# Patient Record
Sex: MALE | Race: WHITE | NOT HISPANIC OR LATINO | Employment: FULL TIME | URBAN - METROPOLITAN AREA
[De-identification: names, ages, dates, MRNs, and addresses within clinical notes are randomized per-mention and may not be internally consistent; named-entity substitution may affect disease eponyms.]

---

## 2018-01-29 DIAGNOSIS — Z11.4 ENCOUNTER FOR SCREENING FOR HIV: ICD-10-CM

## 2018-01-29 DIAGNOSIS — Z11.3 SCREENING EXAMINATION FOR VENEREAL DISEASE: ICD-10-CM

## 2018-01-29 DIAGNOSIS — Z11.59 NEED FOR HEPATITIS C SCREENING TEST: Primary | ICD-10-CM

## 2018-01-29 PROCEDURE — 87340 HEPATITIS B SURFACE AG IA: CPT | Performed by: OBSTETRICS & GYNECOLOGY

## 2018-01-29 PROCEDURE — 87389 HIV-1 AG W/HIV-1&-2 AB AG IA: CPT | Performed by: OBSTETRICS & GYNECOLOGY

## 2018-01-29 PROCEDURE — 36415 COLL VENOUS BLD VENIPUNCTURE: CPT | Performed by: OBSTETRICS & GYNECOLOGY

## 2018-01-29 PROCEDURE — 86780 TREPONEMA PALLIDUM: CPT | Performed by: OBSTETRICS & GYNECOLOGY

## 2018-01-29 PROCEDURE — 86803 HEPATITIS C AB TEST: CPT | Performed by: OBSTETRICS & GYNECOLOGY

## 2018-01-30 LAB
HBV SURFACE AG SERPL QL IA: NONREACTIVE
HCV AB SERPL QL IA: NONREACTIVE
HIV 1+2 AB+HIV1 P24 AG SERPL QL IA: NONREACTIVE
T PALLIDUM IGG+IGM SER QL: NEGATIVE

## 2018-10-16 ENCOUNTER — OFFICE VISIT (OUTPATIENT)
Dept: FAMILY MEDICINE | Facility: CLINIC | Age: 38
End: 2018-10-16
Payer: COMMERCIAL

## 2018-10-16 ENCOUNTER — TELEPHONE (OUTPATIENT)
Dept: FAMILY MEDICINE | Facility: CLINIC | Age: 38
End: 2018-10-16

## 2018-10-16 VITALS
OXYGEN SATURATION: 98 % | TEMPERATURE: 97.6 F | HEIGHT: 71 IN | DIASTOLIC BLOOD PRESSURE: 84 MMHG | HEART RATE: 71 BPM | SYSTOLIC BLOOD PRESSURE: 126 MMHG | RESPIRATION RATE: 14 BRPM | WEIGHT: 187.4 LBS | BODY MASS INDEX: 26.23 KG/M2

## 2018-10-16 DIAGNOSIS — Z00.8 ENCOUNTER FOR BIOMETRIC SCREENING: Primary | ICD-10-CM

## 2018-10-16 DIAGNOSIS — Z23 NEED FOR PROPHYLACTIC VACCINATION AND INOCULATION AGAINST INFLUENZA: ICD-10-CM

## 2018-10-16 LAB
CHOLEST SERPL-MCNC: 164 MG/DL
GLUCOSE SERPL-MCNC: 90 MG/DL (ref 70–99)
HDLC SERPL-MCNC: 57 MG/DL
LDLC SERPL CALC-MCNC: 95 MG/DL
NONHDLC SERPL-MCNC: 107 MG/DL
TRIGL SERPL-MCNC: 59 MG/DL

## 2018-10-16 PROCEDURE — 82947 ASSAY GLUCOSE BLOOD QUANT: CPT | Performed by: INTERNAL MEDICINE

## 2018-10-16 PROCEDURE — 80061 LIPID PANEL: CPT | Performed by: INTERNAL MEDICINE

## 2018-10-16 PROCEDURE — 90471 IMMUNIZATION ADMIN: CPT | Performed by: INTERNAL MEDICINE

## 2018-10-16 PROCEDURE — 90686 IIV4 VACC NO PRSV 0.5 ML IM: CPT | Performed by: INTERNAL MEDICINE

## 2018-10-16 PROCEDURE — 99212 OFFICE O/P EST SF 10 MIN: CPT | Mod: 25 | Performed by: INTERNAL MEDICINE

## 2018-10-16 PROCEDURE — 36415 COLL VENOUS BLD VENIPUNCTURE: CPT | Performed by: INTERNAL MEDICINE

## 2018-10-16 NOTE — PROGRESS NOTES
"  SUBJECTIVE:   Marcin Santo is a 37 year old male who presents to clinic today for the following health issues:  Chief Complaint   Patient presents with     Forms     health screening for employment, patient is not fasting     Imm/Inj     flu vaccine        Marcin presents for cholesterol and glucose screening for work.  He is feeling well.        Problem list and histories reviewed & adjusted, as indicated.  Additional history: as documented    There is no problem list on file for this patient.    Past Surgical History:   Procedure Laterality Date     APPENDECTOMY OPEN  1997       Social History   Substance Use Topics     Smoking status: Never Smoker     Smokeless tobacco: Never Used     Alcohol use Yes      Comment: social 1 time a week      Family History   Problem Relation Age of Onset     Diabetes Paternal Grandfather      Breast Cancer Paternal Grandfather      Prostate Cancer Paternal Grandfather          Current Outpatient Prescriptions   Medication Sig Dispense Refill     NO ACTIVE MEDICATIONS        No Known Allergies    Reviewed and updated as needed this visit by clinical staff  Tobacco  Allergies  Med Hx  Surg Hx  Fam Hx  Soc Hx      Reviewed and updated as needed this visit by Provider         ROS:  Constitutional, cardiovascular, pulmonary systems are negative, except as otherwise noted.    OBJECTIVE:     /84 (BP Location: Right arm, Patient Position: Chair, Cuff Size: Adult Regular)  Pulse 71  Temp 97.6  F (36.4  C) (Tympanic)  Resp 14  Ht 5' 10.5\" (1.791 m)  Wt 187 lb 6.4 oz (85 kg)  SpO2 98%  BMI 26.51 kg/m2  Body mass index is 26.51 kg/(m^2).  GENERAL: healthy, alert and no distress  RESP: lungs clear to auscultation - no rales, rhonchi or wheezes  CV: regular rate and rhythm, normal S1 S2, no S3 or S4, no murmur, click or rub, no peripheral edema         ASSESSMENT/PLAN:       1. Encounter for biometric screening    Labs in process.  Will complete and send form when done. "     - Lipid panel reflex to direct LDL Non-fasting  - Glucose    2. Need for prophylactic vaccination and inoculation against influenza    - FLU VACCINE, SPLIT VIRUS, IM (QUADRIVALENT) [72890]- >3 YRS    Giles Xie MD  [facility]    Injectable Influenza Immunization Documentation    1.  Is the person to be vaccinated sick today?   No    2. Does the person to be vaccinated have an allergy to a component   of the vaccine?   No  Egg Allergy Algorithm Link    3. Has the person to be vaccinated ever had a serious reaction   to influenza vaccine in the past?   No    4. Has the person to be vaccinated ever had Guillain-Barré syndrome?   No    Form completed by Jess LOBO CMA ([facility])

## 2018-10-16 NOTE — MR AVS SNAPSHOT
"              After Visit Summary   10/16/2018    Marcin Santo    MRN: [id number]           Patient Information     Date Of Birth          1980        Visit Information        Provider Department      10/16/2018 9:20 AM Giles Xie MD [facility]        Today's Diagnoses     Need for prophylactic vaccination and inoculation against influenza    -  1    Encounter for biometric screening           Follow-ups after your visit        Follow-up notes from your care team     Return if symptoms worsen or fail to improve.      Who to contact     If you have questions or need follow up information about today's clinic visit or your schedule please contact [facility] directly at [phone].  Normal or non-critical lab and imaging results will be communicated to you by MyChart, letter or phone within 4 business days after the clinic has received the results. If you do not hear from us within 7 days, please contact the clinic through MyChart or phone. If you have a critical or abnormal lab result, we will notify you by phone as soon as possible.  Submit refill requests through MyNextRun or call your pharmacy and they will forward the refill request to us. Please allow 3 business days for your refill to be completed.          Additional Information About Your Visit        Care EveryWhere ID     This is your Care EveryWhere ID. This could be used by other organizations to access your [facility] medical records  [id number]        Your Vitals Were     Pulse Temperature Respirations Height Pulse Oximetry BMI (Body Mass Index)    71 97.6  F (36.4  C) (Tympanic) 14 5' 10.5\" (1.791 m) 98% 26.51 kg/m2       Blood Pressure from Last 3 Encounters:   10/16/18 126/84   08/30/16 115/74   07/29/15 120/80    Weight from Last 3 Encounters:   10/16/18 187 lb 6.4 oz (85 kg)   08/30/16 192 lb (87.1 kg)   07/29/15 190 lb (86.2 kg)              We Performed the Following     FLU VACCINE, SPLIT VIRUS, IM " (QUADRIVALENT) [15957]- >3 YRS     Glucose     Lipid panel reflex to direct LDL Non-fasting        Primary Care Provider    None Specified       No primary provider on file.        Equal Access to Services     WILFRID SCHAEFFER : Gloria Oscar, simon moralse, bunnyfrancisco de la pazirisaudelia churchana m, ana laura cecyin hayaacarl churchcayden doyle juan miguel witt. So [facility] [phone].    ATENCIÓN: Si habla español, tiene a courtney disposición servicios gratuitos de asistencia lingüística. Llame al [phone].    We comply with applicable federal civil rights laws and [place] laws. We do not discriminate on the basis of race, color, national origin, age, disability, sex, sexual orientation, or gender identity.            Thank you!     Thank you for choosing [facility]  for your care. Our goal is always to provide you with excellent care. Hearing back from our patients is one way we can continue to improve our services. Please take a few minutes to complete the written survey that you may receive in the mail after your visit with us. Thank you!             Your Updated Medication List - Protect others around you: Learn how to safely use, store and throw away your medicines at www.disposemymeds.org.          This list is accurate as of 10/16/18  9:42 AM.  Always use your most recent med list.                   Brand Name Dispense Instructions for use Diagnosis    NO ACTIVE MEDICATIONS

## 2019-10-24 ENCOUNTER — OFFICE VISIT (OUTPATIENT)
Dept: DERMATOLOGY | Facility: CLINIC | Age: 39
End: 2019-10-24
Payer: COMMERCIAL

## 2019-10-24 VITALS — DIASTOLIC BLOOD PRESSURE: 83 MMHG | HEART RATE: 68 BPM | SYSTOLIC BLOOD PRESSURE: 118 MMHG | OXYGEN SATURATION: 98 %

## 2019-10-24 DIAGNOSIS — D48.5 NEOPLASM OF UNCERTAIN BEHAVIOR OF SKIN: Primary | ICD-10-CM

## 2019-10-24 DIAGNOSIS — D22.9 MULTIPLE BENIGN NEVI: ICD-10-CM

## 2019-10-24 DIAGNOSIS — D18.01 CHERRY ANGIOMA: ICD-10-CM

## 2019-10-24 DIAGNOSIS — L82.1 SEBORRHEIC KERATOSIS: ICD-10-CM

## 2019-10-24 DIAGNOSIS — L81.4 LENTIGO: ICD-10-CM

## 2019-10-24 PROCEDURE — 88342 IMHCHEM/IMCYTCHM 1ST ANTB: CPT | Mod: TC | Performed by: PHYSICIAN ASSISTANT

## 2019-10-24 PROCEDURE — 99203 OFFICE O/P NEW LOW 30 MIN: CPT | Mod: 25 | Performed by: PHYSICIAN ASSISTANT

## 2019-10-24 PROCEDURE — 88305 TISSUE EXAM BY PATHOLOGIST: CPT | Mod: TC | Performed by: PHYSICIAN ASSISTANT

## 2019-10-24 PROCEDURE — 11103 TANGNTL BX SKIN EA SEP/ADDL: CPT | Performed by: PHYSICIAN ASSISTANT

## 2019-10-24 PROCEDURE — 11102 TANGNTL BX SKIN SINGLE LES: CPT | Performed by: PHYSICIAN ASSISTANT

## 2019-10-24 NOTE — LETTER
10/24/2019         RE: Marcin Santo  [address] [facility] [address]        Dear Colleague,    Thank you for referring your patient, Marcin Santo, to the [facility]. Please see a copy of my visit note below.    Marcin Santo is a 38 year old year old male patient here today for skin check. He notes new growth on frontal scalp.   Associated symptoms: none.  Patient has no other skin complaints today.  Remainder of the HPI, Meds, PMH, Allergies, FH, and SH was reviewed in chart.    Pertinent Hx:   No personal history of skin cancer.   History reviewed. No pertinent past medical history.    Past Surgical History:   Procedure Laterality Date     APPENDECTOMY OPEN  1997        Family History   Problem Relation Age of Onset     Diabetes Paternal Grandfather      Breast Cancer Paternal Grandfather      Prostate Cancer Paternal Grandfather      Melanoma No family hx of        Social History     Socioeconomic History     Marital status:      Spouse name: Not on file     Number of children: Not on file     Years of education: Not on file     Highest education level: Not on file   Occupational History     Not on file   Social Needs     Financial resource strain: Not on file     Food insecurity:     Worry: Not on file     Inability: Not on file     Transportation needs:     Medical: Not on file     Non-medical: Not on file   Tobacco Use     Smoking status: Never Smoker     Smokeless tobacco: Never Used   Substance and Sexual Activity     Alcohol use: Yes     Comment: social 1 time a week      Drug use: No     Sexual activity: Yes     Partners: Female   Lifestyle     Physical activity:     Days per week: Not on file     Minutes per session: Not on file     Stress: Not on file   Relationships     Social connections:     Talks on phone: Not on file     Gets together: Not on file     Attends Latter day service: Not on file     Active member of club or organization: Not on file     Attends meetings of  clubs or organizations: Not on file     Relationship status: Not on file     Intimate partner violence:     Fear of current or ex partner: Not on file     Emotionally abused: Not on file     Physically abused: Not on file     Forced sexual activity: Not on file   Other Topics Concern     Parent/sibling w/ CABG, MI or angioplasty before 65F 55M? No      Service No     Blood Transfusions No     Caffeine Concern No     Occupational Exposure No     Hobby Hazards No     Sleep Concern No     Stress Concern No     Weight Concern No     Special Diet No     Back Care No     Exercise Yes     Bike Helmet Yes     Seat Belt Yes     Self-Exams Yes   Social History Narrative     Not on file       Outpatient Encounter Medications as of 10/24/2019   Medication Sig Dispense Refill     NO ACTIVE MEDICATIONS        No facility-administered encounter medications on file as of 10/24/2019.              Review Of Systems  Skin: As above  Eyes: negative  Ears/Nose/Throat: negative  Respiratory: No shortness of breath, dyspnea on exertion, cough, or hemoptysis  Cardiovascular: negative  Gastrointestinal: negative  Genitourinary: negative  Musculoskeletal: negative  Neurologic: negative  Psychiatric: negative  Hematologic/Lymphatic/Immunologic: negative  Endocrine: negative      O:   NAD, WDWN, Alert & Oriented, Mood & Affect wnl, Vitals stable   Here today alone   /83   Pulse 68   SpO2 98%    General appearance normal   Vitals stable   Alert, oriented and in no acute distress     0.5 cm Brown papule on right frontal scalp  0.3 cm Irregular brownish black macule on right upper back    Stuck on papules and brown macules on trunk and ext   Red papules on trunk  Brown papules and macules with regular pigment network and borders on torso and extremities     The remainder of the detailed exam was unremarkable; the following areas were examined:  scalp/hair, conjunctiva/lids, face, neck, lips/teeth, oral mucosa/gingiva, chest, back,  abdomen, buttocks, digits/nails, RUE, LUE, RLE, LLE.      Eyes: Conjunctivae/lids:Normal     ENT: Lips: normal    MSK:Normal    Cardiovascular: peripheral edema none    Pulm: Breathing Normal    Neuro/Psych: Orientation:Normal; Mood/Affect:Normal  A/P:  1. R/O melanoma on right upper back  TANGENTIAL BIOPSY SENT OUT:  After consent, anesthesia with LEC and prep, tangential excision performed and specimen sent out for permanent section histology.  No complications and routine wound care. Patient told to call our office in 1-2 weeks for result.      2. R/O seborrheic keratosis on right frontal scalp  TANGENTIAL BIOPSY SENT OUT:  After consent, anesthesia with LEC and prep, tangential excision performed and specimen sent out for permanent section histology.  No complications and routine wound care. Patient told to call our office in 1-2 weeks for result.      3. Seborrheic keratosis, lentigo, angioma, benign nevi   BENIGN LESIONS DISCUSSED WITH PATIENT:  I discussed the specifics of tumor, prognosis, and genetics of benign lesions.  I explained that treatment of these lesions would be purely cosmetic and not medically neccessary.  I discussed with patient different removal options including excision, cautery and /or laser.      Nature and genetics of benign skin lesions dicussed with patient.  Signs and Symptoms of skin cancer discussed with patient.  ABCDEs of melanoma reviewed with patient.  Patient encouraged to perform monthly skin exams.  UV precautions reviewed with patient.  Risks of non-melanoma skin cancer discussed with patient   Return to clinic pending biopsy results.       Again, thank you for allowing me to participate in the care of your patient.        Sincerely,        Denice Snowden PA-C

## 2019-10-24 NOTE — PATIENT INSTRUCTIONS
Wound Care Instructions     FOR SUPERFICIAL WOUNDS     [facility] [phone]    [facility] [phone]  Right frontal scalp and Right upper back                         AFTER 24 HOURS YOU SHOULD REMOVE THE BANDAGE AND BEGIN DAILY DRESSING CHANGES AS FOLLOWS:     1) Remove Dressing.     2) Clean and dry the area with tap water using a Q-tip or sterile gauze pad.     3) Apply Vaseline, Aquaphor, Polysporin ointment or Bacitracin ointment over entire wound.  Do NOT use Neosporin ointment.     4) Cover the wound with a band-aid, or a sterile non-stick gauze pad and micropore paper tape      REPEAT THESE INSTRUCTIONS AT LEAST ONCE A DAY UNTIL THE WOUND HAS COMPLETELY HEALED.    It is an old wives tale that a wound heals better when it is exposed to air and allowed to dry out. The wound will heal faster with a better cosmetic result if it is kept moist with ointment and covered with a bandage.    **Do not let the wound dry out.**      Supplies Needed:      *Cotton tipped applicators (Q-tips)    *Polysporin Ointment or Bacitracin Ointment (NOT NEOSPORIN)    *Band-aids or non-stick gauze pads and micropore paper tape.      PATIENT INFORMATION:    During the healing process you will notice a number of changes. All wounds develop a small halo of redness surrounding the wound.  This means healing is occurring. Severe itching with extensive redness usually indicates sensitivity to the ointment or bandage tape used to dress the wound.  You should call our office if this develops.      Swelling  and/or discoloration around your surgical site is common, particularly when performed around the eye.    All wounds normally drain.  The larger the wound the more drainage there will be.  After 7-10 days, you will notice the wound beginning to shrink and new skin will begin to grow.  The wound is healed when you can see skin has formed over the entire area.  A healed wound has a healthy, shiny look to  the surface and is red to dark pink in color to normalize.  Wounds may take approximately 4-6 weeks to heal.  Larger wounds may take 6-8 weeks.  After the wound is healed you may discontinue dressing changes.    You may experience a sensation of tightness as your wound heals. This is normal and will gradually subside.    Your healed wound may be sensitive to temperature changes. This sensitivity improves with time, but if you re having a lot of discomfort, try to avoid temperature extremes.    Patients frequently experience itching after their wound appears to have healed because of the continue healing under the skin.  Plain Vaseline will help relieve the itching.        POSSIBLE COMPLICATIONS    BLEEDIN. Leave the bandage in place.  2. Use tightly rolled up gauze or a cloth to apply direct pressure over the bandage for 30  minutes.  3. Reapply pressure for an additional 30 minutes if necessary  4. Use additional gauze and tape to maintain pressure once the bleeding has stopped.

## 2019-10-24 NOTE — PROGRESS NOTES
Marcin Santo is a 38 year old year old male patient here today for skin check. He notes new growth on frontal scalp.   Associated symptoms: none.  Patient has no other skin complaints today.  Remainder of the HPI, Meds, PMH, Allergies, FH, and SH was reviewed in chart.    Pertinent Hx:   No personal history of skin cancer.   History reviewed. No pertinent past medical history.    Past Surgical History:   Procedure Laterality Date     APPENDECTOMY OPEN  1997        Family History   Problem Relation Age of Onset     Diabetes Paternal Grandfather      Breast Cancer Paternal Grandfather      Prostate Cancer Paternal Grandfather      Melanoma No family hx of        Social History     Socioeconomic History     Marital status:      Spouse name: Not on file     Number of children: Not on file     Years of education: Not on file     Highest education level: Not on file   Occupational History     Not on file   Social Needs     Financial resource strain: Not on file     Food insecurity:     Worry: Not on file     Inability: Not on file     Transportation needs:     Medical: Not on file     Non-medical: Not on file   Tobacco Use     Smoking status: Never Smoker     Smokeless tobacco: Never Used   Substance and Sexual Activity     Alcohol use: Yes     Comment: social 1 time a week      Drug use: No     Sexual activity: Yes     Partners: Female   Lifestyle     Physical activity:     Days per week: Not on file     Minutes per session: Not on file     Stress: Not on file   Relationships     Social connections:     Talks on phone: Not on file     Gets together: Not on file     Attends Jewish service: Not on file     Active member of club or organization: Not on file     Attends meetings of clubs or organizations: Not on file     Relationship status: Not on file     Intimate partner violence:     Fear of current or ex partner: Not on file     Emotionally abused: Not on file     Physically abused: Not on file     Forced  sexual activity: Not on file   Other Topics Concern     Parent/sibling w/ CABG, MI or angioplasty before 65F 55M? No      Service No     Blood Transfusions No     Caffeine Concern No     Occupational Exposure No     Hobby Hazards No     Sleep Concern No     Stress Concern No     Weight Concern No     Special Diet No     Back Care No     Exercise Yes     Bike Helmet Yes     Seat Belt Yes     Self-Exams Yes   Social History Narrative     Not on file       Outpatient Encounter Medications as of 10/24/2019   Medication Sig Dispense Refill     NO ACTIVE MEDICATIONS        No facility-administered encounter medications on file as of 10/24/2019.              Review Of Systems  Skin: As above  Eyes: negative  Ears/Nose/Throat: negative  Respiratory: No shortness of breath, dyspnea on exertion, cough, or hemoptysis  Cardiovascular: negative  Gastrointestinal: negative  Genitourinary: negative  Musculoskeletal: negative  Neurologic: negative  Psychiatric: negative  Hematologic/Lymphatic/Immunologic: negative  Endocrine: negative      O:   NAD, WDWN, Alert & Oriented, Mood & Affect wnl, Vitals stable   Here today alone   /83   Pulse 68   SpO2 98%    General appearance normal   Vitals stable   Alert, oriented and in no acute distress     0.5 cm Brown papule on right frontal scalp  0.3 cm Irregular brownish black macule on right upper back    Stuck on papules and brown macules on trunk and ext   Red papules on trunk  Brown papules and macules with regular pigment network and borders on torso and extremities     The remainder of the detailed exam was unremarkable; the following areas were examined:  scalp/hair, conjunctiva/lids, face, neck, lips/teeth, oral mucosa/gingiva, chest, back, abdomen, buttocks, digits/nails, RUE, LUE, RLE, LLE.      Eyes: Conjunctivae/lids:Normal     ENT: Lips: normal    MSK:Normal    Cardiovascular: peripheral edema none    Pulm: Breathing Normal    Neuro/Psych: Orientation:Normal;  Mood/Affect:Normal  A/P:  1. R/O melanoma on right upper back  TANGENTIAL BIOPSY SENT OUT:  After consent, anesthesia with LEC and prep, tangential excision performed and specimen sent out for permanent section histology.  No complications and routine wound care. Patient told to call our office in 1-2 weeks for result.      2. R/O seborrheic keratosis on right frontal scalp  TANGENTIAL BIOPSY SENT OUT:  After consent, anesthesia with LEC and prep, tangential excision performed and specimen sent out for permanent section histology.  No complications and routine wound care. Patient told to call our office in 1-2 weeks for result.      3. Seborrheic keratosis, lentigo, angioma, benign nevi   BENIGN LESIONS DISCUSSED WITH PATIENT:  I discussed the specifics of tumor, prognosis, and genetics of benign lesions.  I explained that treatment of these lesions would be purely cosmetic and not medically neccessary.  I discussed with patient different removal options including excision, cautery and /or laser.      Nature and genetics of benign skin lesions dicussed with patient.  Signs and Symptoms of skin cancer discussed with patient.  ABCDEs of melanoma reviewed with patient.  Patient encouraged to perform monthly skin exams.  UV precautions reviewed with patient.  Risks of non-melanoma skin cancer discussed with patient   Return to clinic pending biopsy results.

## 2019-10-24 NOTE — NURSING NOTE
"Initial /83   Pulse 68   SpO2 98%  Estimated body mass index is 26.51 kg/m  as calculated from the following:    Height as of 10/16/18: 1.791 m (5' 10.5\").    Weight as of 10/16/18: 85 kg (187 lb 6.4 oz). .    Lola Rousseau LPN    "

## 2019-10-30 LAB — COPATH REPORT: NORMAL

## 2019-12-10 ENCOUNTER — OFFICE VISIT (OUTPATIENT)
Dept: DERMATOLOGY | Facility: CLINIC | Age: 39
End: 2019-12-10
Payer: COMMERCIAL

## 2019-12-10 VITALS — HEART RATE: 68 BPM | SYSTOLIC BLOOD PRESSURE: 129 MMHG | DIASTOLIC BLOOD PRESSURE: 77 MMHG | OXYGEN SATURATION: 97 %

## 2019-12-10 DIAGNOSIS — D48.5 NEOPLASM OF UNCERTAIN BEHAVIOR OF SKIN: Primary | ICD-10-CM

## 2019-12-10 PROCEDURE — 11602 EXC TR-EXT MAL+MARG 1.1-2 CM: CPT | Performed by: DERMATOLOGY

## 2019-12-10 PROCEDURE — 88342 IMHCHEM/IMCYTCHM 1ST ANTB: CPT | Performed by: DERMATOLOGY

## 2019-12-10 PROCEDURE — 88331 PATH CONSLTJ SURG 1 BLK 1SPC: CPT | Performed by: DERMATOLOGY

## 2019-12-10 NOTE — PROGRESS NOTES
Marcin Santo is a 38 year old year old male patient here today for evaluation and managment of severly atypical mole on back.  Patient has no other skin complaints today.  Remainder of the HPI, Meds, PMH, Allergies, FH, and SH was reviewed in chart.    History reviewed. No pertinent past medical history.    Past Surgical History:   Procedure Laterality Date     APPENDECTOMY OPEN  1997        Family History   Problem Relation Age of Onset     Diabetes Paternal Grandfather      Breast Cancer Paternal Grandfather      Prostate Cancer Paternal Grandfather      Melanoma No family hx of        Social History     Socioeconomic History     Marital status:      Spouse name: Not on file     Number of children: Not on file     Years of education: Not on file     Highest education level: Not on file   Occupational History     Not on file   Social Needs     Financial resource strain: Not on file     Food insecurity:     Worry: Not on file     Inability: Not on file     Transportation needs:     Medical: Not on file     Non-medical: Not on file   Tobacco Use     Smoking status: Never Smoker     Smokeless tobacco: Never Used   Substance and Sexual Activity     Alcohol use: Yes     Comment: social 1 time a week      Drug use: No     Sexual activity: Yes     Partners: Female   Lifestyle     Physical activity:     Days per week: Not on file     Minutes per session: Not on file     Stress: Not on file   Relationships     Social connections:     Talks on phone: Not on file     Gets together: Not on file     Attends [employer] service: Not on file     Active member of club or organization: Not on file     Attends meetings of clubs or organizations: Not on file     Relationship status: Not on file     Intimate partner violence:     Fear of current or ex partner: Not on file     Emotionally abused: Not on file     Physically abused: Not on file     Forced sexual activity: Not on file   Other Topics Concern     Parent/sibling w/  CABG, MI or angioplasty before 65F 55M? No      Service No     Blood Transfusions No     Caffeine Concern No     Occupational Exposure No     Hobby Hazards No     Sleep Concern No     Stress Concern No     Weight Concern No     Special Diet No     Back Care No     Exercise Yes     Bike Helmet Yes     Seat Belt Yes     Self-Exams Yes   Social History Narrative     Not on file       Outpatient Encounter Medications as of 12/10/2019   Medication Sig Dispense Refill     NO ACTIVE MEDICATIONS        No facility-administered encounter medications on file as of 12/10/2019.              Review Of Systems  Skin: As above  Eyes: negative  Ears/Nose/Throat: negative  Respiratory: No shortness of breath, dyspnea on exertion, cough, or hemoptysis  Cardiovascular: negative  Gastrointestinal: negative  Genitourinary: negative  Musculoskeletal: negative  Neurologic: negative  Psychiatric: negative  Hematologic/Lymphatic/Immunologic: negative  Endocrine: negative      O:   NAD, WDWN, Alert & Oriented, Mood & Affect wnl, Vitals stable   Here today alone   /77   Pulse 68   SpO2 97%    General appearance normal   Vitals stable   Alert, oriented and in no acute distress     R upper mid back 6mm red macule      Eyes: Conjunctivae/lids:Normal     ENT: Lips, buccal mucosa, tongue: normal    MSK:Normal    Cardiovascular: peripheral edema none    Pulm: Breathing Normal    Lymph Nodes: No Head and Neck Lymphadenopathy     Neuro/Psych: Orientation:Alert and Orientedx3 ; Mood/Affect:normal       MICRO:   R upper mid back:Unremarkable epidermis with parallel bundles of cellular collagen within the superficial dermis.  No concerning areas for malignancy.     A/P:  1. Atypical nevus  Atypical moles are unusual benign moles that may resemble melanoma. People who have them are at increased risk of developing single or multiple melanomas. The higher the number of these moles someone has, the higher the risk; those who have 10 or more  "have 12 times the risk of developing melanoma compared to the general population. Atypical nevi are found significantly more often in melanoma patients than in the general population.    While atypical moles are considered to be pre-cancerous (more likely to turn into melanoma than regular moles), not everyone who has atypical moles gets melanoma. In fact, most moles -- both ordinary and atypical ones -- never become cancerous. Thus the removal of all atypical nevi is unnecessary. In fact, most of the melanomas found on people with atypical moles arise from normal skin and not an atypical mole.    Although a physician bases the initial diagnosis of atypical moles on a physical examination, removing several moles and examining them under a microscope must confirm the diagnosis. This procedure, called a biopsy.    A pathologist will examine the tissue under a microscope and make the precise diagnosis. Diagnosis by biopsy is not exact, and in difficult cases doctors may split 50/50 down the middle as to whether a mole is melanoma or benign. If the pathologist uses the term \"severely dysplastic\" or \"atypical melanocytic hyperplasia\" or offers a long descriptive narrative it means he really is concerned about melanoma, but does not want to call it that.    Most dermatologists usually recommend that all patients with these severely dysplastic moles have them removed. Also many dermatologists recommend removing \"moderate dysplasia\" moles, if the biopsy didn't get all of it. Those with \"mild dysplasia\" can usually be left alone or watched.    R upper mid back sevetrluy atypical nevus  EXCISION OF AN, Margins confirmed with FROZEN SECTIONS and MART1 stain AND Second intent: After thorough discussion of PGACAC, consent obtained, anesthesia and prep, the margins of the lesion were identified and an elliptical incision was made encompassing the lesion with 4mm margin. The incisions were made through the skin and down to and " including the superficial dermis.  The lesion was removed en bloc and submitted for frozen section pathologic review. Clear margins obtained (1.1cm).  MART1 performed on one sectrion.,   REPAIR SECOND INTENT: We discussed the options for wound management in full with the patient including risks/benefits/possible outcomes. Decision made to allow the wound to heal by second intention. EBL minimal; complications none; wound care routine.  The patient was discharged in good condition and will return in one month or prn for wound evaluation.     BENIGN LESIONS DISCUSSED WITH PATIENT:  I discussed the specifics of tumor, prognosis, and genetics of benign lesions.  I explained that treatment of these lesions would be purely cosmetic and not medically neccessary.  I discussed with patient different removal options including excision, cautery and /or laser.      Nature and genetics of benign skin lesions dicussed with patient.  Signs and Symptoms of skin cancer discussed with patient.  ABCDEs of melanoma reviewed with patient.  Patient encouraged to perform monthly skin exams.  UV precautions reviewed with patient.  Patient to follow up with Primary Care provider regarding elevated blood pressure.  Skin care regimen reviewed with patient: Eliminate harsh soaps, i.e. Dial, zest, irsih spring; Mild soaps such as Cetaphil or Dove sensitive skin, avoid hot or cold showers, aggressive use of emollients including vanicream, cetaphil or cerave discussed with patient.    Risks of non-melanoma skin cancer discussed with patient   Return to clinic 6 months

## 2019-12-10 NOTE — PATIENT INSTRUCTIONS
Open Wound Care     for ______________        ? No strenuous activity for 48 hours    ? Take Tylenol as needed for discomfort.                                                .         ? Do not drink alcoholic beverages for 48 hours.    ? Keep the pressure bandage in place for 24 hours. If the bandage becomes blood tinged or loose, reinforce it with gauze and tape.        (Refer to the reverse side of this page for management of bleeding).    ? Remove bandage in 24 hours and begin wound care as follows:     1. Clean area with tap water using a Q tip or gauze pad, (shower / bathe normally)  2. Dry wound with Q tip or gauze pad  3. Apply Aquaphor, Vaseline, Polysporin or Bacitracin Ointment with a Q tip    Do NOT use Neosporin Ointment *  4. Cover the wound with a band-aid or nonstick gauze pad and paper tape.  5. Repeat wound care once a day until wound is completely healed.    It is an old wives tale that a wound heals better when it is exposed to air and allowed to dry out. The wound will heal faster with a better cosmetic result if it is kept moist with ointment and covered with a bandage.  Do not let the wound dry out.      Supplies Needed:                Qtips or gauze pads                Polysporin or Bacitracin Ointment                Bandaids or nonstick gauze pads and paper tape    Wound care kits and brown paper tape are available for purchase at   the pharmacy.    BLEEDIN. Use tightly rolled up gauze or cloth to apply direct pressure over the bandage for 20   minutes.  2. Reapply pressure for an additional 20 minutes if necessary  3. Call the office or go to the nearest emergency room if pressure fails to stop the bleeding.  4. Use additional gauze and tape to maintain pressure once the bleeding has stopped.  5. Begin wound care 24 hours after surgery as directed.                  WOUND HEALING    1. One week after surgery a pink / red halo will form around the outside of the wound.   This is new  skin.  2. The center of the wound will appear yellowish white and produce some drainage.  3. The pink halo will slowly migrate in toward the center of the wound until the wound is covered with new shiny pink skin.  4. There will be no more drainage when the wound is completely healed.  5. It will take six months to one year for the redness to fade.  6. The scar may be itchy, tight and sensitive to extreme temperatures for a year after the surgery.  7. Massaging the area several times a day for several minutes after the wound is completely healed will help the scar soften and normalize faster. Begin massage only after healing is complete.      In case of emergency call: Dr Paulson: [phone]     [facility]: [phone]    [facility]: [phone]

## 2019-12-10 NOTE — LETTER
12/10/2019         RE: Marcin Santo  [address] [facility] [address]        Dear Colleague,    Thank you for referring your patient, Marcin Santo, to the [facility]. Please see a copy of my visit note below.    Surgical Office Location :   [facility]  [address] [place] [address]      Marcin Santo is a 38 year old year old male patient here today for evaluation and managment of severly atypical mole on back.  Patient has no other skin complaints today.  Remainder of the HPI, Meds, PMH, Allergies, FH, and SH was reviewed in chart.    History reviewed. No pertinent past medical history.    Past Surgical History:   Procedure Laterality Date     APPENDECTOMY OPEN  1997        Family History   Problem Relation Age of Onset     Diabetes Paternal Grandfather      Breast Cancer Paternal Grandfather      Prostate Cancer Paternal Grandfather      Melanoma No family hx of        Social History     Socioeconomic History     Marital status:      Spouse name: Not on file     Number of children: Not on file     Years of education: Not on file     Highest education level: Not on file   Occupational History     Not on file   Social Needs     Financial resource strain: Not on file     Food insecurity:     Worry: Not on file     Inability: Not on file     Transportation needs:     Medical: Not on file     Non-medical: Not on file   Tobacco Use     Smoking status: Never Smoker     Smokeless tobacco: Never Used   Substance and Sexual Activity     Alcohol use: Yes     Comment: social 1 time a week      Drug use: No     Sexual activity: Yes     Partners: Female   Lifestyle     Physical activity:     Days per week: Not on file     Minutes per session: Not on file     Stress: Not on file   Relationships     Social connections:     Talks on phone: Not on file     Gets together: Not on file     Attends [employer] service: Not on file     Active member of club or organization: Not on  file     Attends meetings of clubs or organizations: Not on file     Relationship status: Not on file     Intimate partner violence:     Fear of current or ex partner: Not on file     Emotionally abused: Not on file     Physically abused: Not on file     Forced sexual activity: Not on file   Other Topics Concern     Parent/sibling w/ CABG, MI or angioplasty before 65F 55M? No      Service No     Blood Transfusions No     Caffeine Concern No     Occupational Exposure No     Hobby Hazards No     Sleep Concern No     Stress Concern No     Weight Concern No     Special Diet No     Back Care No     Exercise Yes     Bike Helmet Yes     Seat Belt Yes     Self-Exams Yes   Social History Narrative     Not on file       Outpatient Encounter Medications as of 12/10/2019   Medication Sig Dispense Refill     NO ACTIVE MEDICATIONS        No facility-administered encounter medications on file as of 12/10/2019.              Review Of Systems  Skin: As above  Eyes: negative  Ears/Nose/Throat: negative  Respiratory: No shortness of breath, dyspnea on exertion, cough, or hemoptysis  Cardiovascular: negative  Gastrointestinal: negative  Genitourinary: negative  Musculoskeletal: negative  Neurologic: negative  Psychiatric: negative  Hematologic/Lymphatic/Immunologic: negative  Endocrine: negative      O:   NAD, WDWN, Alert & Oriented, Mood & Affect wnl, Vitals stable   Here today alone   /77   Pulse 68   SpO2 97%    General appearance normal   Vitals stable   Alert, oriented and in no acute distress     R upper mid back 6mm red macule      Eyes: Conjunctivae/lids:Normal     ENT: Lips, buccal mucosa, tongue: normal    MSK:Normal    Cardiovascular: peripheral edema none    Pulm: Breathing Normal    Lymph Nodes: No Head and Neck Lymphadenopathy     Neuro/Psych: Orientation:Alert and Orientedx3 ; Mood/Affect:normal       MICRO:   R upper mid back:Unremarkable epidermis with parallel bundles of cellular collagen within the  "superficial dermis.  No concerning areas for malignancy.     A/P:  1. Atypical nevus  Atypical moles are unusual benign moles that may resemble melanoma. People who have them are at increased risk of developing single or multiple melanomas. The higher the number of these moles someone has, the higher the risk; those who have 10 or more have 12 times the risk of developing melanoma compared to the general population. Atypical nevi are found significantly more often in melanoma patients than in the general population.    While atypical moles are considered to be pre-cancerous (more likely to turn into melanoma than regular moles), not everyone who has atypical moles gets melanoma. In fact, most moles -- both ordinary and atypical ones -- never become cancerous. Thus the removal of all atypical nevi is unnecessary. In fact, most of the melanomas found on people with atypical moles arise from normal skin and not an atypical mole.    Although a physician bases the initial diagnosis of atypical moles on a physical examination, removing several moles and examining them under a microscope must confirm the diagnosis. This procedure, called a biopsy.    A pathologist will examine the tissue under a microscope and make the precise diagnosis. Diagnosis by biopsy is not exact, and in difficult cases doctors may split 50/50 down the middle as to whether a mole is melanoma or benign. If the pathologist uses the term \"severely dysplastic\" or \"atypical melanocytic hyperplasia\" or offers a long descriptive narrative it means he really is concerned about melanoma, but does not want to call it that.    Most dermatologists usually recommend that all patients with these severely dysplastic moles have them removed. Also many dermatologists recommend removing \"moderate dysplasia\" moles, if the biopsy didn't get all of it. Those with \"mild dysplasia\" can usually be left alone or watched.    R upper mid back sevetrluy atypical " nevus  EXCISION OF AN, Margins confirmed with FROZEN SECTIONS and MART1 stain AND Second intent: After thorough discussion of PGACAC, consent obtained, anesthesia and prep, the margins of the lesion were identified and an elliptical incision was made encompassing the lesion with 4mm margin. The incisions were made through the skin and down to and including the superficial dermis.  The lesion was removed en bloc and submitted for frozen section pathologic review. Clear margins obtained (1.1cm).  MART1 performed on one sectrion.,   REPAIR SECOND INTENT: We discussed the options for wound management in full with the patient including risks/benefits/possible outcomes. Decision made to allow the wound to heal by second intention. EBL minimal; complications none; wound care routine.  The patient was discharged in good condition and will return in one month or prn for wound evaluation.     BENIGN LESIONS DISCUSSED WITH PATIENT:  I discussed the specifics of tumor, prognosis, and genetics of benign lesions.  I explained that treatment of these lesions would be purely cosmetic and not medically neccessary.  I discussed with patient different removal options including excision, cautery and /or laser.      Nature and genetics of benign skin lesions dicussed with patient.  Signs and Symptoms of skin cancer discussed with patient.  ABCDEs of melanoma reviewed with patient.  Patient encouraged to perform monthly skin exams.  UV precautions reviewed with patient.  Patient to follow up with Primary Care provider regarding elevated blood pressure.  Skin care regimen reviewed with patient: Eliminate harsh soaps, i.e. Dial, zest, irsih spring; Mild soaps such as Cetaphil or Dove sensitive skin, avoid hot or cold showers, aggressive use of emollients including vanicream, cetaphil or cerave discussed with patient.    Risks of non-melanoma skin cancer discussed with patient   Return to clinic 6 months      Again, thank you for allowing  me to participate in the care of your patient.        Sincerely,        Kev Paulson MD

## 2020-03-01 ENCOUNTER — HEALTH MAINTENANCE LETTER (OUTPATIENT)
Age: 40
End: 2020-03-01

## 2020-08-21 ENCOUNTER — OFFICE VISIT (OUTPATIENT)
Dept: DERMATOLOGY | Facility: CLINIC | Age: 40
End: 2020-08-21
Payer: COMMERCIAL

## 2020-08-21 VITALS — SYSTOLIC BLOOD PRESSURE: 116 MMHG | DIASTOLIC BLOOD PRESSURE: 80 MMHG | HEART RATE: 63 BPM | OXYGEN SATURATION: 99 %

## 2020-08-21 DIAGNOSIS — L82.1 SEBORRHEIC KERATOSIS: ICD-10-CM

## 2020-08-21 DIAGNOSIS — Z87.898 HISTORY OF ATYPICAL NEVUS: Primary | ICD-10-CM

## 2020-08-21 DIAGNOSIS — D18.01 CHERRY ANGIOMA: ICD-10-CM

## 2020-08-21 DIAGNOSIS — D22.9 MULTIPLE BENIGN NEVI: ICD-10-CM

## 2020-08-21 DIAGNOSIS — L81.4 LENTIGO: ICD-10-CM

## 2020-08-21 PROCEDURE — 99213 OFFICE O/P EST LOW 20 MIN: CPT | Performed by: PHYSICIAN ASSISTANT

## 2020-08-21 NOTE — LETTER
8/21/2020         RE: Marcin Santo  [address] [facility] [address]        Dear Colleague,    Thank you for referring your patient, Marcin Santo, to the [facility]. Please see a copy of my visit note below.    Marcin Santo is a 39 year old year old male patient here today for skin evaluation. He notes spot on right upper back has healed well. No new or changing nevi.  Patient has no other skin complaints today.  Remainder of the HPI, Meds, PMH, Allergies, FH, and SH was reviewed in chart.    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 No pertinent past medical history.    Past Surgical History:   Procedure Laterality Date     APPENDECTOMY OPEN  1997        Family History   Problem Relation Age of Onset     Diabetes Paternal Grandfather      Breast Cancer Paternal Grandfather      Prostate Cancer Paternal Grandfather      Melanoma No family hx of        Social History     Socioeconomic History     Marital status:      Spouse name: Not on file     Number of children: Not on file     Years of education: Not on file     Highest education level: Not on file   Occupational History     Not on file   Social Needs     Financial resource strain: Not on file     Food insecurity     Worry: Not on file     Inability: Not on file     Transportation needs     Medical: Not on file     Non-medical: Not on file   Tobacco Use     Smoking status: Never Smoker     Smokeless tobacco: Never Used   Substance and Sexual Activity     Alcohol use: Yes     Comment: social 1 time a week      Drug use: No     Sexual activity: Yes     Partners: Female   Lifestyle     Physical activity     Days per week: Not on file     Minutes per session: Not on file     Stress: Not on file   Relationships     Social connections     Talks on phone: Not on file     Gets together: Not on file     Attends [employer] service: Not on file     Active member of club or organization: Not on file      Attends meetings of clubs or organizations: Not on file     Relationship status: Not on file     Intimate partner violence     Fear of current or ex partner: Not on file     Emotionally abused: Not on file     Physically abused: Not on file     Forced sexual activity: Not on file   Other Topics Concern     Parent/sibling w/ CABG, MI or angioplasty before 65F 55M? No      Service No     Blood Transfusions No     Caffeine Concern No     Occupational Exposure No     Hobby Hazards No     Sleep Concern No     Stress Concern No     Weight Concern No     Special Diet No     Back Care No     Exercise Yes     Bike Helmet Yes     Seat Belt Yes     Self-Exams Yes   Social History Narrative     Not on file       Outpatient Encounter Medications as of 8/21/2020   Medication Sig Dispense Refill     NO ACTIVE MEDICATIONS        No facility-administered encounter medications on file as of 8/21/2020.              Review Of Systems  Skin: As above  Eyes: negative  Ears/Nose/Throat: negative  Respiratory: No shortness of breath, dyspnea on exertion, cough, or hemoptysis  Cardiovascular: negative  Gastrointestinal: negative  Genitourinary: negative  Musculoskeletal: negative  Neurologic: negative  Psychiatric: negative  Hematologic/Lymphatic/Immunologic: negative  Endocrine: negative      O:   NAD, WDWN, Alert & Oriented, Mood & Affect wnl, Vitals stable   Here today alone   /80   Pulse 63   SpO2 99%    General appearance normal   Vitals stable   Alert, oriented and in no acute distress   Well healed scar on right upper back   Stuck on papules and brown macules on trunk and ext   Red papules on trunk  Brown papules and macules with regular pigment network and borders on torso and extremities      The remainder of skin exam.       Eyes: Conjunctivae/lids:Normal     ENT: Lips: normal    MSK:Normal    Cardiovascular: peripheral edema none    Pulm: Breathing Normal    Neuro/Psych: Orientation:Alert and Orientedx3 ;  Mood/Affect:normal     A/P:  1. History of severely atypical nevus   No evidence or recurrence.   2. Seborrheic keratosis, lentigo, angioma, benign nevi   BENIGN LESIONS DISCUSSED WITH PATIENT:  I discussed the specifics of tumor, prognosis, and genetics of benign lesions.  I explained that treatment of these lesions would be purely cosmetic and not medically neccessary.  I discussed with patient different removal options including excision, cautery and /or laser.      Nature and genetics of benign skin lesions dicussed with patient.  Signs and Symptoms of skin cancer discussed with patient.  ABCDEs of melanoma reviewed with patient.  Patient encouraged to perform monthly skin exams.  UV precautions reviewed with patient..    Risks of non-melanoma skin cancer discussed with patient   Return to clinic in one year or sooner if needed.         Again, thank you for allowing me to participate in the care of your patient.        Sincerely,        Denice Snowden PA-C

## 2020-08-21 NOTE — PROGRESS NOTES
Marcin Santo is a 39 year old year old male patient here today for skin evaluation. He notes spot on right upper back has healed well. No new or changing nevi.  Patient has no other skin complaints today.  Remainder of the HPI, Meds, PMH, Allergies, FH, and SH was reviewed in chart.    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 No pertinent past medical history.    Past Surgical History:   Procedure Laterality Date     APPENDECTOMY OPEN  1997        Family History   Problem Relation Age of Onset     Diabetes Paternal Grandfather      Breast Cancer Paternal Grandfather      Prostate Cancer Paternal Grandfather      Melanoma No family hx of        Social History     Socioeconomic History     Marital status:      Spouse name: Not on file     Number of children: Not on file     Years of education: Not on file     Highest education level: Not on file   Occupational History     Not on file   Social Needs     Financial resource strain: Not on file     Food insecurity     Worry: Not on file     Inability: Not on file     Transportation needs     Medical: Not on file     Non-medical: Not on file   Tobacco Use     Smoking status: Never Smoker     Smokeless tobacco: Never Used   Substance and Sexual Activity     Alcohol use: Yes     Comment: social 1 time a week      Drug use: No     Sexual activity: Yes     Partners: Female   Lifestyle     Physical activity     Days per week: Not on file     Minutes per session: Not on file     Stress: Not on file   Relationships     Social connections     Talks on phone: Not on file     Gets together: Not on file     Attends Orthodoxy service: Not on file     Active member of club or organization: Not on file     Attends meetings of clubs or organizations: Not on file     Relationship status: Not on file     Intimate partner violence     Fear of current or ex partner: Not on file     Emotionally abused: Not on file     Physically abused: Not on  file     Forced sexual activity: Not on file   Other Topics Concern     Parent/sibling w/ CABG, MI or angioplasty before 65F 55M? No      Service No     Blood Transfusions No     Caffeine Concern No     Occupational Exposure No     Hobby Hazards No     Sleep Concern No     Stress Concern No     Weight Concern No     Special Diet No     Back Care No     Exercise Yes     Bike Helmet Yes     Seat Belt Yes     Self-Exams Yes   Social History Narrative     Not on file       Outpatient Encounter Medications as of 8/21/2020   Medication Sig Dispense Refill     NO ACTIVE MEDICATIONS        No facility-administered encounter medications on file as of 8/21/2020.              Review Of Systems  Skin: As above  Eyes: negative  Ears/Nose/Throat: negative  Respiratory: No shortness of breath, dyspnea on exertion, cough, or hemoptysis  Cardiovascular: negative  Gastrointestinal: negative  Genitourinary: negative  Musculoskeletal: negative  Neurologic: negative  Psychiatric: negative  Hematologic/Lymphatic/Immunologic: negative  Endocrine: negative      O:   NAD, WDWN, Alert & Oriented, Mood & Affect wnl, Vitals stable   Here today alone   /80   Pulse 63   SpO2 99%    General appearance normal   Vitals stable   Alert, oriented and in no acute distress   Well healed scar on right upper back   Stuck on papules and brown macules on trunk and ext   Red papules on trunk  Brown papules and macules with regular pigment network and borders on torso and extremities      The remainder of skin exam.       Eyes: Conjunctivae/lids:Normal     ENT: Lips: normal    MSK:Normal    Cardiovascular: peripheral edema none    Pulm: Breathing Normal    Neuro/Psych: Orientation:Alert and Orientedx3 ; Mood/Affect:normal     A/P:  1. History of severely atypical nevus   No evidence or recurrence.   2. Seborrheic keratosis, lentigo, angioma, benign nevi   BENIGN LESIONS DISCUSSED WITH PATIENT:  I discussed the specifics of tumor, prognosis,  and genetics of benign lesions.  I explained that treatment of these lesions would be purely cosmetic and not medically neccessary.  I discussed with patient different removal options including excision, cautery and /or laser.      Nature and genetics of benign skin lesions dicussed with patient.  Signs and Symptoms of skin cancer discussed with patient.  ABCDEs of melanoma reviewed with patient.  Patient encouraged to perform monthly skin exams.  UV precautions reviewed with patient..    Risks of non-melanoma skin cancer discussed with patient   Return to clinic in one year or sooner if needed.

## 2020-09-11 ENCOUNTER — OFFICE VISIT (OUTPATIENT)
Dept: FAMILY MEDICINE | Facility: CLINIC | Age: 40
End: 2020-09-11
Payer: COMMERCIAL

## 2020-09-11 VITALS
DIASTOLIC BLOOD PRESSURE: 80 MMHG | HEIGHT: 71 IN | OXYGEN SATURATION: 99 % | HEART RATE: 68 BPM | WEIGHT: 176 LBS | TEMPERATURE: 97.4 F | BODY MASS INDEX: 24.64 KG/M2 | RESPIRATION RATE: 16 BRPM | SYSTOLIC BLOOD PRESSURE: 110 MMHG

## 2020-09-11 DIAGNOSIS — Z23 NEED FOR PROPHYLACTIC VACCINATION AND INOCULATION AGAINST INFLUENZA: ICD-10-CM

## 2020-09-11 DIAGNOSIS — Z00.8 ENCOUNTER FOR BIOMETRIC SCREENING: Primary | ICD-10-CM

## 2020-09-11 LAB
CHOLEST SERPL-MCNC: 178 MG/DL
GLUCOSE SERPL-MCNC: 83 MG/DL (ref 70–99)
HDLC SERPL-MCNC: 61 MG/DL
LDLC SERPL CALC-MCNC: 107 MG/DL
NONHDLC SERPL-MCNC: 117 MG/DL
TRIGL SERPL-MCNC: 52 MG/DL

## 2020-09-11 PROCEDURE — 90471 IMMUNIZATION ADMIN: CPT | Performed by: INTERNAL MEDICINE

## 2020-09-11 PROCEDURE — 90686 IIV4 VACC NO PRSV 0.5 ML IM: CPT | Performed by: INTERNAL MEDICINE

## 2020-09-11 PROCEDURE — 80061 LIPID PANEL: CPT | Performed by: INTERNAL MEDICINE

## 2020-09-11 PROCEDURE — 99212 OFFICE O/P EST SF 10 MIN: CPT | Mod: 25 | Performed by: INTERNAL MEDICINE

## 2020-09-11 PROCEDURE — 82947 ASSAY GLUCOSE BLOOD QUANT: CPT | Performed by: INTERNAL MEDICINE

## 2020-09-11 PROCEDURE — 36415 COLL VENOUS BLD VENIPUNCTURE: CPT | Performed by: INTERNAL MEDICINE

## 2020-09-11 ASSESSMENT — MIFFLIN-ST. JEOR: SCORE: 1735.46

## 2020-09-11 NOTE — PROGRESS NOTES
"Subjective     Marcin Santo is a 39 year old male who presents to clinic today for the following health issues:    HPI       Chief Complaint   Patient presents with     Forms     Biometric screening form for work.     Flu Shot     Flu shot today.     Blood Draw     He is fasting today for labs.       German has been doing an intermittent fasting diet since January and has been losing weight on that plus exercise.  Weight is down 11 pounds like last measurement here two years ago.  He is overall feeling well.       Review of Systems   Constitutional,cardiovascular, pulmonary systems are negative, except as otherwise noted.      Objective    /80   Pulse 68   Temp 97.4  F (36.3  C) (Tympanic)   Resp 16   Ht 1.803 m (5' 11\")   Wt 79.8 kg (176 lb)   SpO2 99%   BMI 24.55 kg/m    Body mass index is 24.55 kg/m .  Physical Exam   GENERAL: healthy, alert and no distress  RESP: lungs clear to auscultation - no rales, rhonchi or wheezes  CV: regular rate and rhythm, normal S1 S2, no S3 or S4, no murmur, click or rub    Waist circumference 30 inches          Assessment & Plan     Encounter for biometric screening    LDL just a bit high at 107, other values are good.  Continue healthy lifestyle measures    The ASCVD Risk score (Quinten DC Jr., et al., 2013) failed to calculate for the following reasons:    The 2013 ASCVD risk score is only valid for ages 40 to 79     - Glucose  - Lipid panel reflex to direct LDL Fasting    Need for prophylactic vaccination and inoculation against influenza    - INFLUENZA VACCINE IM > 6 MONTHS VALENT IIV4 [17476]  - Vaccine Administration, Initial [84357]         Giles Xie MD  [facility]    "

## 2020-10-19 ENCOUNTER — MYC MEDICAL ADVICE (OUTPATIENT)
Dept: FAMILY MEDICINE | Facility: CLINIC | Age: 40
End: 2020-10-19

## 2020-10-19 NOTE — TELEPHONE ENCOUNTER
Form is scanned into the media tab under the date 9/15/20- could we please fax this to the number at the bottom of the form?      Thanks,  Giles Xie MD

## 2020-11-20 ENCOUNTER — MYC MEDICAL ADVICE (OUTPATIENT)
Dept: FAMILY MEDICINE | Facility: CLINIC | Age: 40
End: 2020-11-20

## 2020-11-20 NOTE — TELEPHONE ENCOUNTER
[facility] - RN faxed the biometrics form again this morning.  Seems that is not the place it is to go.  Please fax to [phone] and notify patient via mychart.    GARRET Stovall, RN

## 2021-02-04 ENCOUNTER — VIRTUAL VISIT (OUTPATIENT)
Dept: FAMILY MEDICINE | Facility: CLINIC | Age: 41
End: 2021-02-04
Payer: COMMERCIAL

## 2021-02-04 DIAGNOSIS — Z20.822 SUSPECTED COVID-19 VIRUS INFECTION: ICD-10-CM

## 2021-02-04 DIAGNOSIS — Z20.822 EXPOSURE TO COVID-19 VIRUS: Primary | ICD-10-CM

## 2021-02-04 PROCEDURE — U0005 INFEC AGEN DETEC AMPLI PROBE: HCPCS | Performed by: INTERNAL MEDICINE

## 2021-02-04 PROCEDURE — 99213 OFFICE O/P EST LOW 20 MIN: CPT | Mod: 95 | Performed by: INTERNAL MEDICINE

## 2021-02-04 PROCEDURE — U0003 INFECTIOUS AGENT DETECTION BY NUCLEIC ACID (DNA OR RNA); SEVERE ACUTE RESPIRATORY SYNDROME CORONAVIRUS 2 (SARS-COV-2) (CORONAVIRUS DISEASE [COVID-19]), AMPLIFIED PROBE TECHNIQUE, MAKING USE OF HIGH THROUGHPUT TECHNOLOGIES AS DESCRIBED BY CMS-2020-01-R: HCPCS | Performed by: INTERNAL MEDICINE

## 2021-02-04 NOTE — PATIENT INSTRUCTIONS
COVID Quarantine     Q:  How long should you quarantine at home?  A:  You should stay home, not going to home, work, school, or any other place outside of home.  Stay home until all 3 things are true:   1. You feel better.  Your cough, shortness of breath and other symptoms are improving.   AND   2. It has been more than 10 days since you first felt sick   AND   3. You have had no fever in the last 24 hours, without using medications that lowers fevers    From [facility]  Https://www.health.state.[place]./diseases/coronavirus/case.pdf        Home Isolation for Known or Suspected COVID    1. Stay home.  Do not use public transportation or ride share (Uber, Lyft)  2. Separate yourself from people in your house.  As much as possible, you should stay in a specific room and away from other people in her home.  Also use a separate bathroom, if available.    3. Wear a facemask if you need to be around other people and cover your mouth and nose with the tissue when cough or sneeze  4. Avoid sharing personal household items.  You should not share dishes, drinking glasses, cups, eating utensils, towels, or bedding with other people in your home.  After using these items, they should be washed thoroughly with soap and water.  Clean all high touch surfaces in your home daily  5. Wash your hands often with soap and water for at least 20 seconds, or used an alcohol based hand  containing 60% to 95% alcohol.  Avoid touching your face with unwashed hands.  6. Watch for worsening symptoms, shortness of breath, or difficulty breathing.  7. If you need medical care, contact your healthcare provider.  If you need emergency medical attention during this time, call 911 and let them know you are being tested for COVID-19.  8. If you have any questions please contact our clinic directly or [facility] at [phone].    Link to above  Information  Https://www.health.[facility].[place]./diseases/coronavirus/hcp/puiselfiso.pdf    [facility] information on cleaning/disinfecting.  https://www.cdc.gov/coronavirus/2019-ncov/prevent-getting-sick/cleaning-disinfection.html?CDC_AA_refVal=https%3A%2F%2Fwww.cdc.gov%2Fcoronavirus%7K3495-jdam%2Fprepare%2Fcleaning-disinfection.html          For information on enrolling in COVID studies, please go to studyfinder.[facility]

## 2021-02-04 NOTE — PROGRESS NOTES
German is a 40 year old who is being evaluated via a billable video visit.      How would you like to obtain your AVS? MyChart  If the video visit is dropped, the invitation should be resent by: Text to cell phone: [phone]  Will anyone else be joining your video visit? No      Video Start Time: 8:12 AM  Assessment & Plan     Exposure to COVID-19 virus    Suspected COVID-19 virus infection -we will arrange for patient to be tested.  Discussed quarantine rules, home isolation.  He is very familiar with these and plans to strictly adhere to the guidelines.  - Symptomatic COVID-19 Virus (Coronavirus) by PCR; Future                         Patient Instructions         COVID Quarantine     Q:  How long should you quarantine at home?  A:  You should stay home, not going to home, work, school, or any other place outside of home.  Stay home until all 3 things are true:   1. You feel better.  Your cough, shortness of breath and other symptoms are improving.   AND   2. It has been more than 10 days since you first felt sick   AND   3. You have had no fever in the last 24 hours, without using medications that lowers fevers    From [place] Department of Health  Https://www.health.[facility].[place]/diseases/coronavirus/case.pdf        Home Isolation for Known or Suspected COVID    1. Stay home.  Do not use public transportation or ride share (Uber, Lyft)  2. Separate yourself from people in your house.  As much as possible, you should stay in a specific room and away from other people in her home.  Also use a separate bathroom, if available.    3. Wear a facemask if you need to be around other people and cover your mouth and nose with the tissue when cough or sneeze  4. Avoid sharing personal household items.  You should not share dishes, drinking glasses, cups, eating utensils, towels, or bedding with other people in your home.  After using these items, they should be washed thoroughly with soap and water.  Clean all high touch surfaces  in your home daily  5. Wash your hands often with soap and water for at least 20 seconds, or used an alcohol based hand  containing 60% to 95% alcohol.  Avoid touching your face with unwashed hands.  6. Watch for worsening symptoms, shortness of breath, or difficulty breathing.  7. If you need medical care, contact your healthcare provider.  If you need emergency medical attention during this time, call 911 and let them know you are being tested for COVID-19.  8. If you have any questions please contact our clinic directly or [facility] at [phone].    Link to above Information  Https://www.health.[facility].[place]./diseases/coronavirus/hcp/puiselfiso.pdf    [facility] information on cleaning/disinfecting.  https://www.cdc.gov/coronavirus/2019-ncov/prevent-getting-sick/cleaning-disinfection.html?CDC_AA_refVal=https%3A%2F%2Fwww.cdc.gov%2Fcoronavirus%2M5544-vzns%2Fprepare%2Fcleaning-disinfection.html          For information on enrolling in COVID studies, please go to studyfinder.[facility]          No follow-ups on file.    Lauryn Singh, [facility]    Wilfredo Porter is a 40 year old who presents to clinic today for the following health issues     HPI         Concern for COVID-19  About how many days ago did these symptoms start? 6-7days  Is this your first visit for this illness? Yes  In the 14 days before your symptoms started, have you had close contact with someone with COVID-19 (Coronavirus)? Yes, I have been in contact with someone who has COVID-19/Coronavirus (confirmed by lab test).  Do you have a fever or chills? No  Are you having new or worsening difficulty breathing? No  Do you have new or worsening cough? No  Have you had any new or unexplained body aches? YES    Have you experienced any of the following NEW symptoms?    Headache: YES    Sore throat: No    Loss of taste or smell: No    Chest pain: No    Diarrhea: No    Rash: No  What treatments have you  tried? none  Who do you live with? Spouse, 3 children  Are you, or a household member, a healthcare worker or a ? No  Do you live in a nursing home, group home, or shelter? No  Do you have a way to get food/medications if quarantined? Yes delivery  --Wife is positive, he has been wearing mask in the house since  --children in .  He has otherwise been been diligent  --son with pink eye (negative test 2 weeks ago) and other children with mild 'cold symptoms  --he has headache, myalgias  --he ordered in how saliva test                Review of Systems   CONSTITUTIONAL: NEGATIVE for fever, chills, change in weight  ENT/MOUTH: NEGATIVE for ear, mouth and throat problems  RESP: NEGATIVE for significant cough or SOB  CV: NEGATIVE for chest pain, palpitations or peripheral edema      Objective           Vitals:  No vitals were obtained today due to virtual visit.    Physical Exam   GENERAL: Healthy, alert and no distress  EYES: Eyes grossly normal to inspection.  No discharge or erythema, or obvious scleral/conjunctival abnormalities.  RESP: No audible wheeze, cough, or visible cyanosis.  No visible retractions or increased work of breathing.    SKIN: Visible skin clear. No significant rash, abnormal pigmentation or lesions.  NEURO: Cranial nerves grossly intact.  Mentation and speech appropriate for age.  PSYCH: Mentation appears normal, affect normal/bright, judgement and insight intact, normal speech and appearance well-groomed.                Video-Visit Details    Type of service:  Video Visit    Video End Time:8:22 AM    Originating Location (pt. Location): Home    Distant Location (provider location):  [facility]     Platform used for Video Visit: Bluwan

## 2021-02-05 LAB
LABORATORY COMMENT REPORT: ABNORMAL
SARS-COV-2 RNA RESP QL NAA+PROBE: NORMAL
SARS-COV-2 RNA RESP QL NAA+PROBE: POSITIVE
SPECIMEN SOURCE: ABNORMAL
SPECIMEN SOURCE: NORMAL

## 2021-04-17 ENCOUNTER — HEALTH MAINTENANCE LETTER (OUTPATIENT)
Age: 41
End: 2021-04-17

## 2021-10-02 ENCOUNTER — HEALTH MAINTENANCE LETTER (OUTPATIENT)
Age: 41
End: 2021-10-02

## 2021-10-26 ENCOUNTER — OFFICE VISIT (OUTPATIENT)
Dept: FAMILY MEDICINE | Facility: CLINIC | Age: 41
End: 2021-10-26
Payer: COMMERCIAL

## 2021-10-26 ENCOUNTER — TELEPHONE (OUTPATIENT)
Dept: FAMILY MEDICINE | Facility: CLINIC | Age: 41
End: 2021-10-26

## 2021-10-26 VITALS
OXYGEN SATURATION: 99 % | HEIGHT: 71 IN | TEMPERATURE: 97.6 F | HEART RATE: 65 BPM | SYSTOLIC BLOOD PRESSURE: 104 MMHG | RESPIRATION RATE: 16 BRPM | BODY MASS INDEX: 24.67 KG/M2 | WEIGHT: 176.2 LBS | DIASTOLIC BLOOD PRESSURE: 82 MMHG

## 2021-10-26 DIAGNOSIS — Z00.00 ROUTINE GENERAL MEDICAL EXAMINATION AT A HEALTH CARE FACILITY: Primary | ICD-10-CM

## 2021-10-26 DIAGNOSIS — Z13.1 SCREENING FOR DIABETES MELLITUS: ICD-10-CM

## 2021-10-26 DIAGNOSIS — Z13.6 CARDIOVASCULAR SCREENING; LDL GOAL LESS THAN 100: ICD-10-CM

## 2021-10-26 DIAGNOSIS — Z23 NEED FOR PROPHYLACTIC VACCINATION AND INOCULATION AGAINST INFLUENZA: ICD-10-CM

## 2021-10-26 DIAGNOSIS — Z23 NEED FOR VACCINATION: ICD-10-CM

## 2021-10-26 LAB
CHOLEST SERPL-MCNC: 153 MG/DL
FASTING STATUS PATIENT QL REPORTED: YES
FASTING STATUS PATIENT QL REPORTED: YES
GLUCOSE BLD-MCNC: 89 MG/DL (ref 70–99)
HDLC SERPL-MCNC: 70 MG/DL
LDLC SERPL CALC-MCNC: 74 MG/DL
NONHDLC SERPL-MCNC: 83 MG/DL
TRIGL SERPL-MCNC: 47 MG/DL

## 2021-10-26 PROCEDURE — 99396 PREV VISIT EST AGE 40-64: CPT | Mod: 25 | Performed by: NURSE PRACTITIONER

## 2021-10-26 PROCEDURE — 90686 IIV4 VACC NO PRSV 0.5 ML IM: CPT | Performed by: NURSE PRACTITIONER

## 2021-10-26 PROCEDURE — 90472 IMMUNIZATION ADMIN EACH ADD: CPT | Performed by: NURSE PRACTITIONER

## 2021-10-26 PROCEDURE — 80061 LIPID PANEL: CPT | Performed by: NURSE PRACTITIONER

## 2021-10-26 PROCEDURE — 36415 COLL VENOUS BLD VENIPUNCTURE: CPT | Performed by: NURSE PRACTITIONER

## 2021-10-26 PROCEDURE — 90715 TDAP VACCINE 7 YRS/> IM: CPT | Performed by: NURSE PRACTITIONER

## 2021-10-26 PROCEDURE — 90471 IMMUNIZATION ADMIN: CPT | Performed by: NURSE PRACTITIONER

## 2021-10-26 PROCEDURE — 82947 ASSAY GLUCOSE BLOOD QUANT: CPT | Performed by: NURSE PRACTITIONER

## 2021-10-26 ASSESSMENT — ENCOUNTER SYMPTOMS
SORE THROAT: 0
MYALGIAS: 0
COUGH: 0
PARESTHESIAS: 0
FREQUENCY: 0
ABDOMINAL PAIN: 0
CONSTIPATION: 0
CHILLS: 0
ARTHRALGIAS: 0
SHORTNESS OF BREATH: 0
DIARRHEA: 0
JOINT SWELLING: 0
EYE PAIN: 0
HEMATURIA: 0
HEMATOCHEZIA: 0
HEADACHES: 0
NAUSEA: 0
HEARTBURN: 0
DIZZINESS: 0
NERVOUS/ANXIOUS: 0
DYSURIA: 0
FEVER: 0
PALPITATIONS: 0
WEAKNESS: 0

## 2021-10-26 ASSESSMENT — MIFFLIN-ST. JEOR: SCORE: 1723.43

## 2021-10-26 NOTE — PROGRESS NOTES
SUBJECTIVE:   CC: Marcin Santo is an 40 year old male who presents for preventative health visit.     Patient has been advised of split billing requirements and indicates understanding: Yes  Healthy Habits:     Getting at least 3 servings of Calcium per day:  Yes    Bi-annual eye exam:  Yes    Dental care twice a year:  Yes    Sleep apnea or symptoms of sleep apnea:  None    Diet:  Regular (no restrictions) and Breakfast skipped    Frequency of exercise:  1 day/week    Duration of exercise:  Less than 15 minutes    Taking medications regularly:  Yes    Medication side effects:  None    PHQ-2 Total Score: 0    Additional concerns today:  No    -------------------------------------    Today's PHQ-2 Score:   PHQ-2 ( 1999 Pfizer) 10/26/2021   Q1: Little interest or pleasure in doing things 0   Q2: Feeling down, depressed or hopeless 0   PHQ-2 Score 0   Q1: Little interest or pleasure in doing things Not at all   Q2: Feeling down, depressed or hopeless Not at all   PHQ-2 Score 0       Abuse: Current or Past(Physical, Sexual or Emotional)- No  Do you feel safe in your environment? Yes    Have you ever done Advance Care Planning? (For example, a Health Directive, POLST, or a discussion with a medical provider or your loved ones about your wishes): No, advance care planning information given to patient to review.  Patient plans to discuss their wishes with loved ones or provider.      Social History     Tobacco Use     Smoking status: Never Smoker     Smokeless tobacco: Never Used   Substance Use Topics     Alcohol use: Yes     Comment: social 1 time a week        Alcohol Use 10/26/2021   Prescreen: >3 drinks/day or >7 drinks/week? No   Prescreen: >3 drinks/day or >7 drinks/week? -     Last PSA: No results found for: PSA    Reviewed orders with patient. Reviewed health maintenance and updated orders accordingly - Yes  BP Readings from Last 3 Encounters:   10/26/21 104/82   09/11/20 110/80   08/21/20 116/80    Wt Readings  from Last 3 Encounters:   10/26/21 79.9 kg (176 lb 3.2 oz)   09/11/20 79.8 kg (176 lb)   10/16/18 85 kg (187 lb 6.4 oz)                    Reviewed and updated as needed this visit by clinical staff                 Reviewed and updated as needed this visit by Provider                    Review of Systems   Constitutional: Negative for chills and fever.   HENT: Negative for congestion, ear pain, hearing loss and sore throat.    Eyes: Negative for pain and visual disturbance.   Respiratory: Negative for cough and shortness of breath.    Cardiovascular: Negative for chest pain, palpitations and peripheral edema.   Gastrointestinal: Negative for abdominal pain, constipation, diarrhea, heartburn, hematochezia and nausea.   Genitourinary: Negative for discharge, dysuria, frequency, genital sores, hematuria, impotence and urgency.   Musculoskeletal: Negative for arthralgias, joint swelling and myalgias.   Skin: Negative for rash.   Neurological: Negative for dizziness, weakness, headaches and paresthesias.   Psychiatric/Behavioral: Negative for mood changes. The patient is not nervous/anxious.      CONSTITUTIONAL: NEGATIVE for fever, chills, change in weight  INTEGUMENTARY/SKIN: NEGATIVE for worrisome rashes, moles or lesions  EYES: NEGATIVE for vision changes or irritation  ENT: NEGATIVE for ear, mouth and throat problems  RESP: NEGATIVE for significant cough or SOB  CV: NEGATIVE for chest pain, palpitations or peripheral edema  GI: NEGATIVE for nausea, abdominal pain, heartburn, or change in bowel habits   male: negative for dysuria, hematuria, decreased urinary stream, erectile dysfunction, urethral discharge  MUSCULOSKELETAL: NEGATIVE for significant arthralgias or myalgia  NEURO: NEGATIVE for weakness, dizziness or paresthesias  PSYCHIATRIC: NEGATIVE for changes in mood or affect    OBJECTIVE:   There were no vitals taken for this visit.    Physical Exam  GENERAL: healthy, alert and no distress  EYES: Eyes grossly  "normal to inspection, PERRL and conjunctivae and sclerae normal  HENT: ear canals and TM's normal, nose and mouth without ulcers or lesions  NECK: no adenopathy, no asymmetry, masses, or scars and thyroid normal to palpation  RESP: lungs clear to auscultation - no rales, rhonchi or wheezes  CV: regular rate and rhythm, normal S1 S2, no S3 or S4, no murmur, click or rub, no peripheral edema and peripheral pulses strong  ABDOMEN: soft, nontender, no hepatosplenomegaly, no masses and bowel sounds normal  MS: no gross musculoskeletal defects noted, no edema  SKIN: no suspicious lesions or rashes  NEURO: Normal strength and tone, mentation intact and speech normal  PSYCH: mentation appears normal, affect normal/bright    Diagnostic Test Results:  Labs reviewed in Epic    ASSESSMENT/PLAN:   (Z00.00) Routine general medical examination at a health care facility  (primary encounter diagnosis)  Comment:   Plan:     (Z23) Need for vaccination  Comment:   Plan: TDAP VACCINE (Adacel, Boostrix)  [[id number]],         INFLUENZA VACCINE IM > 6 MONTHS VALENT IIV4         (AFLURIA/FLUZONE)            (Z23) Need for prophylactic vaccination and inoculation against influenza  Comment:   Plan:     (Z13.6) CARDIOVASCULAR SCREENING; LDL GOAL LESS THAN 100  Comment:   Plan: Lipid panel reflex to direct LDL Fasting,           (Z13.1) Screening for diabetes mellitus  Comment:   Plan: Glucose              Patient has been advised of split billing requirements and indicates understanding: Yes  COUNSELING:   Reviewed preventive health counseling, as reflected in patient instructions       Regular exercise       Healthy diet/nutrition    Estimated body mass index is 24.55 kg/m  as calculated from the following:    Height as of 9/11/20: 1.803 m (5' 11\").    Weight as of 9/11/20: 79.8 kg (176 lb).         He reports that he has never smoked. He has never used smokeless tobacco.      Counseling Resources:  ATP IV Guidelines  Pooled Cohorts Equation " Calculator  FRAX Risk Assessment  ICSI Preventive Guidelines  Dietary Guidelines for Americans, 2010  USDA's MyPlate  ASA Prophylaxis  Lung CA Screening    PHILL Morales [facility]

## 2021-10-27 NOTE — TELEPHONE ENCOUNTER
[facility] biometric form completed and signed at appointment.  Form was faxed to [phone]. Copy of form sent to scan and copy placed in basket.

## 2021-11-09 ENCOUNTER — OFFICE VISIT (OUTPATIENT)
Dept: DERMATOLOGY | Facility: CLINIC | Age: 41
End: 2021-11-09
Payer: COMMERCIAL

## 2021-11-09 VITALS — SYSTOLIC BLOOD PRESSURE: 121 MMHG | OXYGEN SATURATION: 97 % | HEART RATE: 65 BPM | DIASTOLIC BLOOD PRESSURE: 73 MMHG

## 2021-11-09 DIAGNOSIS — D22.9 MULTIPLE BENIGN NEVI: ICD-10-CM

## 2021-11-09 DIAGNOSIS — Z87.898 HISTORY OF ATYPICAL NEVUS: Primary | ICD-10-CM

## 2021-11-09 DIAGNOSIS — L82.1 SEBORRHEIC KERATOSIS: ICD-10-CM

## 2021-11-09 DIAGNOSIS — L81.4 LENTIGO: ICD-10-CM

## 2021-11-09 DIAGNOSIS — D18.01 CHERRY ANGIOMA: ICD-10-CM

## 2021-11-09 PROCEDURE — 99213 OFFICE O/P EST LOW 20 MIN: CPT | Performed by: PHYSICIAN ASSISTANT

## 2021-11-09 NOTE — NURSING NOTE
Chief Complaint   Patient presents with     Skin Check     no concerns       Vitals:    11/09/21 0755   BP: 121/73   BP Location: Left arm   Patient Position: Sitting   Cuff Size: Adult Regular   Pulse: 65   SpO2: 97%     Wt Readings from Last 1 Encounters:   10/26/21 79.9 kg (176 lb 3.2 oz)       Joanne Vieyra LPN .................11/9/2021

## 2021-11-09 NOTE — PROGRESS NOTES
Marcin Santo is an extremely pleasant 40 year old year old male patient here today for skin check. He denies any new or changing nevi. No painful or bleeding skin lesions.  Patient has no other skin complaints today.  Remainder of the HPI, Meds, PMH, Allergies, FH, and SH was reviewed in chart.    Pertinent Hx:  No personal history of skin cancer.   No past medical history on file.    Past Surgical History:   Procedure Laterality Date     APPENDECTOMY OPEN  1997        Family History   Problem Relation Age of Onset     Diabetes Paternal Grandfather      Breast Cancer Paternal Grandfather      Prostate Cancer Paternal Grandfather      Melanoma No family hx of        Social History     Socioeconomic History     Marital status:      Spouse name: Not on file     Number of children: Not on file     Years of education: Not on file     Highest education level: Not on file   Occupational History     Not on file   Tobacco Use     Smoking status: Never Smoker     Smokeless tobacco: Never Used   Substance and Sexual Activity     Alcohol use: Yes     Comment: social 1 time a week      Drug use: No     Sexual activity: Yes     Partners: Female   Other Topics Concern     Parent/sibling w/ CABG, MI or angioplasty before 65F 55M? No      Service No     Blood Transfusions No     Caffeine Concern No     Occupational Exposure No     Hobby Hazards No     Sleep Concern No     Stress Concern No     Weight Concern No     Special Diet No     Back Care No     Exercise Yes     Bike Helmet Yes     Seat Belt Yes     Self-Exams Yes   Social History Narrative     Not on file     Social Determinants of Health     Financial Resource Strain: Not on file   Food Insecurity: Not on file   Transportation Needs: Not on file   Physical Activity: Not on file   Stress: Not on file   Social Connections: Not on file   Intimate Partner Violence: Not on file   Housing Stability: Not on file       Outpatient Encounter Medications as of 11/9/2021    Medication Sig Dispense Refill     NO ACTIVE MEDICATIONS  (Patient not taking: Reported on 10/26/2021)       No facility-administered encounter medications on file as of 11/9/2021.             O:   NAD, WDWN, Alert & Oriented, Mood & Affect wnl, Vitals stable   Here today alone   /73 (BP Location: Left arm, Patient Position: Sitting, Cuff Size: Adult Regular)   Pulse 65   SpO2 97%    General appearance normal   Vitals stable   Alert, oriented and in no acute distress       Well healed scar on right upper back   Stuck on papules and brown macules on trunk and ext   Red papules on trunk  Brown papules and macules with regular pigment network and borders on torso and extremities     The remainder of skin exam.          Eyes: Conjunctivae/lids:Normal     ENT: Lips: normal    MSK:Normal    Cardiovascular: peripheral edema none    Pulm: Breathing Normal    Neuro/Psych: Orientation:Alert and Orientedx3 ; Mood/Affect:normal     A/P:  1. History of severely atypical nevus   No evidence or recurrence.   2. Seborrheic keratosis, lentigo, angioma, benign nevi   BENIGN LESIONS DISCUSSED WITH PATIENT:  I discussed the specifics of tumor, prognosis, and genetics of benign lesions.  I explained that treatment of these lesions would be purely cosmetic and not medically neccessary.  I discussed with patient different removal options including excision, cautery and /or laser.       Nature and genetics of benign skin lesions dicussed with patient.  Signs and Symptoms of skin cancer discussed with patient.  ABCDEs of melanoma reviewed with patient.  Patient encouraged to perform monthly skin exams.  UV precautions reviewed with patient..    Risks of non-melanoma skin cancer discussed with patient   Return to clinic in one year or sooner if needed.

## 2021-11-09 NOTE — LETTER
11/9/2021         RE: Marcin Santo  [id number] [facility] [address]        Dear Colleague,    Thank you for referring your patient, Marcin Santo, to the [facility]. Please see a copy of my visit note below.    Marcin Santo is an extremely pleasant 40 year old year old male patient here today for skin check. He denies any new or changing nevi. No painful or bleeding skin lesions.  Patient has no other skin complaints today.  Remainder of the HPI, Meds, PMH, Allergies, FH, and SH was reviewed in chart.    Pertinent Hx:  No personal history of skin cancer.   No past medical history on file.    Past Surgical History:   Procedure Laterality Date     APPENDECTOMY OPEN  1997        Family History   Problem Relation Age of Onset     Diabetes Paternal Grandfather      Breast Cancer Paternal Grandfather      Prostate Cancer Paternal Grandfather      Melanoma No family hx of        Social History     Socioeconomic History     Marital status:      Spouse name: Not on file     Number of children: Not on file     Years of education: Not on file     Highest education level: Not on file   Occupational History     Not on file   Tobacco Use     Smoking status: Never Smoker     Smokeless tobacco: Never Used   Substance and Sexual Activity     Alcohol use: Yes     Comment: social 1 time a week      Drug use: No     Sexual activity: Yes     Partners: Female   Other Topics Concern     Parent/sibling w/ CABG, MI or angioplasty before 65F 55M? No      Service No     Blood Transfusions No     Caffeine Concern No     Occupational Exposure No     Hobby Hazards No     Sleep Concern No     Stress Concern No     Weight Concern No     Special Diet No     Back Care No     Exercise Yes     Bike Helmet Yes     Seat Belt Yes     Self-Exams Yes   Social History Narrative     Not on file     Social Determinants of Health     Financial Resource Strain: Not on file   Food Insecurity: Not on file    Transportation Needs: Not on file   Physical Activity: Not on file   Stress: Not on file   Social Connections: Not on file   Intimate Partner Violence: Not on file   Housing Stability: Not on file       Outpatient Encounter Medications as of 11/9/2021   Medication Sig Dispense Refill     NO ACTIVE MEDICATIONS  (Patient not taking: Reported on 10/26/2021)       No facility-administered encounter medications on file as of 11/9/2021.             O:   NAD, WDWN, Alert & Oriented, Mood & Affect wnl, Vitals stable   Here today alone   /73 (BP Location: Left arm, Patient Position: Sitting, Cuff Size: Adult Regular)   Pulse 65   SpO2 97%    General appearance normal   Vitals stable   Alert, oriented and in no acute distress       Well healed scar on right upper back   Stuck on papules and brown macules on trunk and ext   Red papules on trunk  Brown papules and macules with regular pigment network and borders on torso and extremities     The remainder of skin exam.          Eyes: Conjunctivae/lids:Normal     ENT: Lips: normal    MSK:Normal    Cardiovascular: peripheral edema none    Pulm: Breathing Normal    Neuro/Psych: Orientation:Alert and Orientedx3 ; Mood/Affect:normal     A/P:  1. History of severely atypical nevus   No evidence or recurrence.   2. Seborrheic keratosis, lentigo, angioma, benign nevi   BENIGN LESIONS DISCUSSED WITH PATIENT:  I discussed the specifics of tumor, prognosis, and genetics of benign lesions.  I explained that treatment of these lesions would be purely cosmetic and not medically neccessary.  I discussed with patient different removal options including excision, cautery and /or laser.       Nature and genetics of benign skin lesions dicussed with patient.  Signs and Symptoms of skin cancer discussed with patient.  ABCDEs of melanoma reviewed with patient.  Patient encouraged to perform monthly skin exams.  UV precautions reviewed with patient..    Risks of non-melanoma skin cancer  discussed with patient   Return to clinic in one year or sooner if needed.       Again, thank you for allowing me to participate in the care of your patient.        Sincerely,        Denice Snowden PA-C

## 2022-09-03 ENCOUNTER — HEALTH MAINTENANCE LETTER (OUTPATIENT)
Age: 42
End: 2022-09-03

## 2022-09-26 ASSESSMENT — ENCOUNTER SYMPTOMS
SORE THROAT: 0
CHILLS: 0
PALPITATIONS: 0
JOINT SWELLING: 0
ARTHRALGIAS: 0
HEADACHES: 0
WEAKNESS: 0
MYALGIAS: 0
PARESTHESIAS: 0
DIZZINESS: 0
FEVER: 0
ABDOMINAL PAIN: 0
CONSTIPATION: 0
HEMATURIA: 0
SHORTNESS OF BREATH: 0
NAUSEA: 0
DYSURIA: 0
COUGH: 0
FREQUENCY: 0
EYE PAIN: 0
HEMATOCHEZIA: 0
NERVOUS/ANXIOUS: 0
HEARTBURN: 0
DIARRHEA: 0

## 2022-09-27 ENCOUNTER — OFFICE VISIT (OUTPATIENT)
Dept: FAMILY MEDICINE | Facility: CLINIC | Age: 42
End: 2022-09-27
Payer: COMMERCIAL

## 2022-09-27 VITALS
SYSTOLIC BLOOD PRESSURE: 120 MMHG | DIASTOLIC BLOOD PRESSURE: 86 MMHG | RESPIRATION RATE: 18 BRPM | HEART RATE: 61 BPM | BODY MASS INDEX: 25.15 KG/M2 | HEIGHT: 71 IN | WEIGHT: 179.6 LBS | OXYGEN SATURATION: 98 % | TEMPERATURE: 96.7 F

## 2022-09-27 DIAGNOSIS — Z00.00 ROUTINE PHYSICAL EXAMINATION: Primary | ICD-10-CM

## 2022-09-27 DIAGNOSIS — Z23 HIGH PRIORITY FOR 2019-NCOV VACCINE: ICD-10-CM

## 2022-09-27 DIAGNOSIS — Z13.6 CARDIOVASCULAR SCREENING; LDL GOAL LESS THAN 130: ICD-10-CM

## 2022-09-27 DIAGNOSIS — Z23 NEED FOR PROPHYLACTIC VACCINATION AND INOCULATION AGAINST INFLUENZA: ICD-10-CM

## 2022-09-27 LAB
ANION GAP SERPL CALCULATED.3IONS-SCNC: 10 MMOL/L (ref 7–15)
BUN SERPL-MCNC: 11.5 MG/DL (ref 6–20)
CALCIUM SERPL-MCNC: 9.5 MG/DL (ref 8.6–10)
CHLORIDE SERPL-SCNC: 103 MMOL/L (ref 98–107)
CHOLEST SERPL-MCNC: 184 MG/DL
CREAT SERPL-MCNC: 0.97 MG/DL (ref 0.67–1.17)
DEPRECATED HCO3 PLAS-SCNC: 27 MMOL/L (ref 22–29)
GFR SERPL CREATININE-BSD FRML MDRD: >90 ML/MIN/1.73M2
GLUCOSE SERPL-MCNC: 96 MG/DL (ref 70–99)
HDLC SERPL-MCNC: 69 MG/DL
LDLC SERPL CALC-MCNC: 106 MG/DL
NONHDLC SERPL-MCNC: 115 MG/DL
POTASSIUM SERPL-SCNC: 4.5 MMOL/L (ref 3.4–5.3)
SODIUM SERPL-SCNC: 140 MMOL/L (ref 136–145)
TRIGL SERPL-MCNC: 44 MG/DL

## 2022-09-27 PROCEDURE — 80061 LIPID PANEL: CPT | Performed by: NURSE PRACTITIONER

## 2022-09-27 PROCEDURE — 90686 IIV4 VACC NO PRSV 0.5 ML IM: CPT | Performed by: NURSE PRACTITIONER

## 2022-09-27 PROCEDURE — 99396 PREV VISIT EST AGE 40-64: CPT | Mod: 25 | Performed by: NURSE PRACTITIONER

## 2022-09-27 PROCEDURE — 0134A COVID-19,PF,MODERNA BIVALENT: CPT | Performed by: NURSE PRACTITIONER

## 2022-09-27 PROCEDURE — 90471 IMMUNIZATION ADMIN: CPT | Performed by: NURSE PRACTITIONER

## 2022-09-27 PROCEDURE — 91313 COVID-19,PF,MODERNA BIVALENT: CPT | Performed by: NURSE PRACTITIONER

## 2022-09-27 PROCEDURE — 36415 COLL VENOUS BLD VENIPUNCTURE: CPT | Performed by: NURSE PRACTITIONER

## 2022-09-27 PROCEDURE — 80048 BASIC METABOLIC PNL TOTAL CA: CPT | Performed by: NURSE PRACTITIONER

## 2022-09-27 ASSESSMENT — ENCOUNTER SYMPTOMS
HEMATOCHEZIA: 0
SORE THROAT: 0
DYSURIA: 0
HEADACHES: 0
NERVOUS/ANXIOUS: 0
WEAKNESS: 0
HEARTBURN: 0
DIARRHEA: 0
EYE PAIN: 0
NAUSEA: 0
COUGH: 0
PALPITATIONS: 0
PARESTHESIAS: 0
FEVER: 0
CHILLS: 0
CONSTIPATION: 0
MYALGIAS: 0
FREQUENCY: 0
ABDOMINAL PAIN: 0
HEMATURIA: 0
ARTHRALGIAS: 0
SHORTNESS OF BREATH: 0
JOINT SWELLING: 0
DIZZINESS: 0

## 2022-09-27 NOTE — PROGRESS NOTES
SUBJECTIVE:   CC: German is an 41 year old who presents for preventative health visit.       Patient has been advised of split billing requirements and indicates understanding: Yes  Healthy Habits:     Getting at least 3 servings of Calcium per day:  Yes    Bi-annual eye exam:  Yes    Dental care twice a year:  Yes    Sleep apnea or symptoms of sleep apnea:  None    Diet:  Regular (no restrictions) and Breakfast skipped    Frequency of exercise:  2-3 days/week    Duration of exercise:  30-45 minutes    Taking medications regularly:  Yes    Medication side effects:  Not applicable and None    PHQ-2 Total Score: 0    Additional concerns today:  Yes        Today's PHQ-2 Score:   PHQ-2 ( 1999 [employer]) 9/26/2022   Q1: Little interest or pleasure in doing things 0   Q2: Feeling down, depressed or hopeless 0   PHQ-2 Score 0   PHQ-2 Total Score (12-17 Years)- Positive if 3 or more points; Administer PHQ-A if positive -   Q1: Little interest or pleasure in doing things Not at all   Q2: Feeling down, depressed or hopeless Not at all   PHQ-2 Score 0       Abuse: Current or Past(Physical, Sexual or Emotional)- No  Do you feel safe in your environment? Yes        Social History     Tobacco Use     Smoking status: Never Smoker     Smokeless tobacco: Never Used   Substance Use Topics     Alcohol use: Yes     Comment: social 1 time a week      If you drink alcohol do you typically have >3 drinks per day or >7 drinks per week? No    Alcohol Use 9/27/2022   Prescreen: >3 drinks/day or >7 drinks/week? -   Prescreen: >3 drinks/day or >7 drinks/week? No       Last PSA: No results found for: PSA    Reviewed orders with patient. Reviewed health maintenance and updated orders accordingly - Yes  BP Readings from Last 3 Encounters:   09/27/22 120/86   11/09/21 121/73   10/26/21 104/82    Wt Readings from Last 3 Encounters:   09/27/22 81.5 kg (179 lb 9.6 oz)   10/26/21 79.9 kg (176 lb 3.2 oz)   09/11/20 79.8 kg (176 lb)                  There is  "no problem list on file for this patient.    Past Surgical History:   Procedure Laterality Date     APPENDECTOMY OPEN  1997       Social History     Tobacco Use     Smoking status: Never Smoker     Smokeless tobacco: Never Used   Substance Use Topics     Alcohol use: Yes     Comment: social 1 time a week      Family History   Problem Relation Age of Onset     Diabetes Father      Diabetes Paternal Grandfather      Breast Cancer Paternal Grandfather      Prostate Cancer Paternal Grandfather      Melanoma No family hx of          Current Outpatient Medications   Medication Sig Dispense Refill     NO ACTIVE MEDICATIONS  (Patient not taking: Reported on 10/26/2021)         Reviewed and updated as needed this visit by clinical staff   Tobacco  Allergies  Meds   Med Hx  Surg Hx  Fam Hx  Soc Hx          Reviewed and updated as needed this visit by Provider                       Review of Systems   Constitutional: Negative for chills and fever.   HENT: Negative for congestion, ear pain, hearing loss and sore throat.    Eyes: Negative for pain and visual disturbance.   Respiratory: Negative for cough and shortness of breath.    Cardiovascular: Negative for chest pain, palpitations and peripheral edema.   Gastrointestinal: Negative for abdominal pain, constipation, diarrhea, heartburn, hematochezia and nausea.   Genitourinary: Negative for dysuria, frequency, genital sores, hematuria, impotence, penile discharge and urgency.   Musculoskeletal: Negative for arthralgias, joint swelling and myalgias.   Skin: Negative for rash.   Neurological: Negative for dizziness, weakness, headaches and paresthesias.   Psychiatric/Behavioral: Negative for mood changes. The patient is not nervous/anxious.          OBJECTIVE:   /86   Pulse 61   Temp (!) 96.7  F (35.9  C) (Tympanic)   Resp 18   Ht 1.803 m (5' 11\")   Wt 81.5 kg (179 lb 9.6 oz)   SpO2 98%   BMI 25.05 kg/m      Physical Exam  GENERAL: healthy, alert and no " "distress  EYES: Eyes grossly normal to inspection, PERRL and conjunctivae and sclerae normal  HENT: ear canals and TM's normal, nose and mouth without ulcers or lesions  NECK: no adenopathy, no asymmetry, masses, or scars and thyroid normal to palpation  RESP: lungs clear to auscultation - no rales, rhonchi or wheezes  CV: regular rates and rhythm, normal S1 S2, no S3 or S4, no murmur, click or rub and no peripheral edema  ABDOMEN: soft, nontender, without hepatosplenomegaly or masses and no palpable or pulsatile masses  MS: no gross musculoskeletal defects noted, no edema  SKIN: no suspicious lesions or rashes  NEURO: Normal strength and tone, mentation intact and speech normal  PSYCH: mentation appears normal, affect normal/bright    Diagnostic Test Results:  Labs reviewed in Epic    ASSESSMENT/PLAN:       ICD-10-CM    1. Routine physical examination  Z00.00 Basic metabolic panel  (Ca, Cl, CO2, Creat, Gluc, K, Na, BUN)     Basic metabolic panel  (Ca, Cl, CO2, Creat, Gluc, K, Na, BUN)   2. CARDIOVASCULAR SCREENING; LDL GOAL LESS THAN 130  Z13.6 Lipid panel reflex to direct LDL Fasting     Lipid panel reflex to direct LDL Fasting   3. Need for prophylactic vaccination and inoculation against influenza  Z23 INFLUENZA VACCINE IM > 6 MONTHS VALENT IIV4 (AFLURIA/FLUZONE)   4. High priority for 2019-nCoV vaccine  Z23 COVID-19,PF,MODERNA BIVALENT 18+Yrs           COUNSELING:   Reviewed preventive health counseling, as reflected in patient instructions    Estimated body mass index is 25.05 kg/m  as calculated from the following:    Height as of this encounter: 1.803 m (5' 11\").    Weight as of this encounter: 81.5 kg (179 lb 9.6 oz).         He reports that he has never smoked. He has never used smokeless tobacco.      Counseling Resources:  ATP IV Guidelines  Pooled Cohorts Equation Calculator  FRAX Risk Assessment  ICSI Preventive Guidelines  Dietary Guidelines for Americans, 2010  USDA's MyPlate  ASA Prophylaxis  Lung " CA Screening    PHILL Pope CNP  [facility]

## 2022-09-28 NOTE — RESULT ENCOUNTER NOTE
"Uvaldopo Burch    Your lab results came back within normal/acceptable limits. Will get your form faxed tomorrow when I'm back in the office. Please let us know if you have any questions.     Take care,    PHILL Hearn CNP    TEST DESCRIPTIONS   CBC (Complete Blood Count) includes hemoglobin, hematocrit, white blood cells, etc. This test can be used to detect anemia, infection, and abnormalities in blood cells.   Cholesterol is one of the blood fats (lipids) and is the building block used by the body for cell wall and hormone production. Increased levels of cholesterol have been proven to directly contribute to heart disease and strokes.   Triglycerides are one of the blood fats (lipids) and are thought to be associated with heart disease. If you have had anything to eat or drink other than water for 12 hours before the test and your level is high, you should have the test repeated after a 12 hour fast. Abnormally high results may also be associated with diabetes, kidney and liver diseases.   LDL is the low density lipoprotein component of the cholesterol. It is the harmful substance that deposits cholesterol on the artery walls contributing to heart attacks and strokes. A high LDL level is associated with higher risk of coronary heart disease.   HDL stands for high density lipoprotein and refers to the so-called \"good\" cholesterol. HDL picks up excess cholesterol in the bloodstream and carries it back to the liver for disposal. Individuals with higher than average HDL seem to have a lower risk of coronary disease. Vigorous exercise will help increase the blood levels of HDL.   Risk Factor (Total cholesterol/HDL) is a commonly used ratio for cardiac risk assessment. Less than 5.0 for men and 4.4 for women is ideal.   Sodium is an electrolyte useful in diagnosis of dehydration, diabetes, hypertension, or other diseases involving electrolyte imbalance. It also preserves the balance between calcium and potassium " to maintain normal heart action and equilibrium of the body.   Potassium is also an electrolyte that works with sodium to regulate the body's water balance and normalize heart rhythm.   Glucose is a measure of blood sugar and is one of the tests for diabetes. If you have not been fasting, your level will often be high. A low glucose level may be a cause of weakness or dizziness. Blood sugar ranks with cholesterol as a causative factor in arteriosclerosis and heart attacks.   BUN & Creatinine are waste products excreted by the kidneys. A high BUN can be related to a high protein diet, heavy exercise, fever and infections, dehydration, kidney stones, and kidney disease. Creatinine elevation is less dependent on diet or exercise and better represents kidney impairment. Low values are not generally significant.   Calcium is a mineral in the blood controlled by the parathyroid glands and kidneys. It is important in the formation of bone, in muscle and nerve function, and in blood clotting. Disease of the parathyroid gland, diseased bones or kidneys, or defective absorption of calcium may cause abnormal levels from the intestine.   ALT, AST, Alk Phos are liver tests. Enzymes found in the liver as well as skeletal and cardiac muscle. Elevations can often be seen in alcoholism, liver or heart disease. Slightly abnormal values are not considered significant.   TSH stands for Thyroid Stimulating Hormone. A sensitive test used to determine how the thyroid gland is functioning. The thyroid gland produces hormones that control the body's metabolism. When too few hormones are produced (increased TSH), hypothyroidism occurs which can cause fatigue, sensitivity to cold, and weight gain - an overall slowing down of bodily functions. When too many thyroid hormones are produces (or decreased TSH), it creates a condition call hyperthyroidism (such as Graves' disease) which causes rapid heartbeat, weight loss, and dizziness, among other  symptoms.   PSA stands for Prostate Specific Antigen. Elevated levels of PSA can increase with trauma, infection, inflammation, or disease processes in the prostate such as BPH (Benigh Prostatic Hypertrophy) or cancer.   Glycohemoglobin or HgBA1C is a 3-month average of blood sugar

## 2022-12-06 ENCOUNTER — OFFICE VISIT (OUTPATIENT)
Dept: DERMATOLOGY | Facility: CLINIC | Age: 42
End: 2022-12-06
Payer: COMMERCIAL

## 2022-12-06 DIAGNOSIS — D18.01 CHERRY ANGIOMA: ICD-10-CM

## 2022-12-06 DIAGNOSIS — L82.1 SEBORRHEIC KERATOSIS: ICD-10-CM

## 2022-12-06 DIAGNOSIS — D22.9 MULTIPLE BENIGN NEVI: ICD-10-CM

## 2022-12-06 DIAGNOSIS — L30.9 DERMATITIS: Primary | ICD-10-CM

## 2022-12-06 DIAGNOSIS — L81.4 LENTIGO: ICD-10-CM

## 2022-12-06 DIAGNOSIS — Z87.898 HISTORY OF ATYPICAL NEVUS: ICD-10-CM

## 2022-12-06 PROCEDURE — 99213 OFFICE O/P EST LOW 20 MIN: CPT | Performed by: PHYSICIAN ASSISTANT

## 2022-12-06 RX ORDER — TRIAMCINOLONE ACETONIDE 1 MG/G
OINTMENT TOPICAL
Qty: 30 G | Refills: 4 | Status: SHIPPED | OUTPATIENT
Start: 2022-12-06 | End: 2023-08-31

## 2022-12-06 ASSESSMENT — PAIN SCALES - GENERAL: PAINLEVEL: NO PAIN (0)

## 2022-12-06 NOTE — NURSING NOTE
Chief Complaint   Patient presents with     Skin Check     No concerns     Rash     Lower abdomen around belt line, has had for months, no itching or pain, just there,        There were no vitals filed for this visit.  Wt Readings from Last 1 Encounters:   09/27/22 81.5 kg (179 lb 9.6 oz)       Joanne Vieyra LPN .................12/6/2022

## 2022-12-06 NOTE — LETTER
12/6/2022         RE: Marcin Santo  [address] [facility] [address]        Dear Colleague,    Thank you for referring your patient, Marcin Santo, to the [facility]. Please see a copy of my visit note below.    Marcin Santo is an extremely pleasant 40 year old year old male patient here today for skin check. He denies any new or changing nevi. No painful or bleeding skin lesions. He also notes rash on lower abdomen/waist. Asymptomatic, he has tried antifungal and cortisone with improvements.  Patient has no other skin complaints today.  Remainder of the HPI, Meds, PMH, Allergies, FH, and SH was reviewed in chart.    Pertinent Hx:  No personal history of skin cancer.   History reviewed. No pertinent past medical history.    Past Surgical History:   Procedure Laterality Date     APPENDECTOMY OPEN  1997        Family History   Problem Relation Age of Onset     Diabetes Father      Diabetes Paternal Grandfather      Breast Cancer Paternal Grandfather      Prostate Cancer Paternal Grandfather      Melanoma No family hx of        Social History     Socioeconomic History     Marital status:      Spouse name: Not on file     Number of children: Not on file     Years of education: Not on file     Highest education level: Not on file   Occupational History     Not on file   Tobacco Use     Smoking status: Never     Smokeless tobacco: Never   Substance and Sexual Activity     Alcohol use: Yes     Comment: social 1 time a week      Drug use: No     Sexual activity: Yes     Partners: Female   Other Topics Concern     Parent/sibling w/ CABG, MI or angioplasty before 65F 55M? No      Service No     Blood Transfusions No     Caffeine Concern No     Occupational Exposure No     Hobby Hazards No     Sleep Concern No     Stress Concern No     Weight Concern No     Special Diet No     Back Care No     Exercise Yes     Bike Helmet Yes     Seat Belt Yes     Self-Exams Yes   Social History  Narrative     Not on file     Social Determinants of Health     Financial Resource Strain: Not on file   Food Insecurity: Not on file   Transportation Needs: Not on file   Physical Activity: Not on file   Stress: Not on file   Social Connections: Not on file   Intimate Partner Violence: Not on file   Housing Stability: Not on file       Outpatient Encounter Medications as of 12/6/2022   Medication Sig Dispense Refill     NO ACTIVE MEDICATIONS        triamcinolone (KENALOG) 0.1 % external ointment Apply sparingly twice daily as needed for three weeks. 30 g 4     No facility-administered encounter medications on file as of 12/6/2022.             O:   NAD, WDWN, Alert & Oriented, Mood & Affect wnl, Vitals stable   Here today alone   There were no vitals taken for this visit.   General appearance normal   Vitals stable   Alert, oriented and in no acute distress       Well healed scar on right upper back   Stuck on papules and brown macules on trunk and ext   Red papules on trunk  Brown papules and macules with regular pigment network and borders on torso and extremities   Mild pink dry thin plaques on lower abdomen, waist   The remainder of skin exam.          Eyes: Conjunctivae/lids:Normal     ENT: Lips: normal    MSK:Normal    Cardiovascular: peripheral edema none    Pulm: Breathing Normal    Neuro/Psych: Orientation:Alert and Orientedx3 ; Mood/Affect:normal     A/P:  1. History of severely atypical nevus   No evidence or recurrence.   2. dermatitis on lower abdomen   Clinically looks like irritant vs atopic dermatitis  apply tac as needed, use sparingly for next 3 weeks.   Please let me know if does not resolve.   3. Seborrheic keratosis, lentigo, angioma, benign nevi   BENIGN LESIONS DISCUSSED WITH PATIENT:  I discussed the specifics of tumor, prognosis, and genetics of benign lesions.  I explained that treatment of these lesions would be purely cosmetic and not medically neccessary.  I discussed with patient  different removal options including excision, cautery and /or laser.       Nature and genetics of benign skin lesions dicussed with patient.  Signs and Symptoms of skin cancer discussed with patient.  ABCDEs of melanoma reviewed with patient.  Patient encouraged to perform monthly skin exams.  UV precautions reviewed with patient..    Risks of non-melanoma skin cancer discussed with patient   Return to clinic in one year or sooner if needed.       Again, thank you for allowing me to participate in the care of your patient.        Sincerely,        Denice Snowden PA-C

## 2022-12-06 NOTE — PROGRESS NOTES
Marcin Santo is an extremely pleasant 40 year old year old male patient here today for skin check. He denies any new or changing nevi. No painful or bleeding skin lesions. He also notes rash on lower abdomen/waist. Asymptomatic, he has tried antifungal and cortisone with improvements.  Patient has no other skin complaints today.  Remainder of the HPI, Meds, PMH, Allergies, FH, and SH was reviewed in chart.    Pertinent Hx:  No personal history of skin cancer.   History reviewed. No pertinent past medical history.    Past Surgical History:   Procedure Laterality Date     APPENDECTOMY OPEN  1997        Family History   Problem Relation Age of Onset     Diabetes Father      Diabetes Paternal Grandfather      Breast Cancer Paternal Grandfather      Prostate Cancer Paternal Grandfather      Melanoma No family hx of        Social History     Socioeconomic History     Marital status:      Spouse name: Not on file     Number of children: Not on file     Years of education: Not on file     Highest education level: Not on file   Occupational History     Not on file   Tobacco Use     Smoking status: Never     Smokeless tobacco: Never   Substance and Sexual Activity     Alcohol use: Yes     Comment: social 1 time a week      Drug use: No     Sexual activity: Yes     Partners: Female   Other Topics Concern     Parent/sibling w/ CABG, MI or angioplasty before 65F 55M? No      Service No     Blood Transfusions No     Caffeine Concern No     Occupational Exposure No     Hobby Hazards No     Sleep Concern No     Stress Concern No     Weight Concern No     Special Diet No     Back Care No     Exercise Yes     Bike Helmet Yes     Seat Belt Yes     Self-Exams Yes   Social History Narrative     Not on file     Social Determinants of Health     Financial Resource Strain: Not on file   Food Insecurity: Not on file   Transportation Needs: Not on file   Physical Activity: Not on file   Stress: Not on file   Social  Connections: Not on file   Intimate Partner Violence: Not on file   Housing Stability: Not on file       Outpatient Encounter Medications as of 12/6/2022   Medication Sig Dispense Refill     NO ACTIVE MEDICATIONS        triamcinolone (KENALOG) 0.1 % external ointment Apply sparingly twice daily as needed for three weeks. 30 g 4     No facility-administered encounter medications on file as of 12/6/2022.             O:   NAD, WDWN, Alert & Oriented, Mood & Affect wnl, Vitals stable   Here today alone   There were no vitals taken for this visit.   General appearance normal   Vitals stable   Alert, oriented and in no acute distress       Well healed scar on right upper back   Stuck on papules and brown macules on trunk and ext   Red papules on trunk  Brown papules and macules with regular pigment network and borders on torso and extremities   Mild pink dry thin plaques on lower abdomen, waist   The remainder of skin exam.          Eyes: Conjunctivae/lids:Normal     ENT: Lips: normal    MSK:Normal    Cardiovascular: peripheral edema none    Pulm: Breathing Normal    Neuro/Psych: Orientation:Alert and Orientedx3 ; Mood/Affect:normal     A/P:  1. History of severely atypical nevus   No evidence or recurrence.   2. dermatitis on lower abdomen   Clinically looks like irritant vs atopic dermatitis  apply tac as needed, use sparingly for next 3 weeks.   Please let me know if does not resolve.   3. Seborrheic keratosis, lentigo, angioma, benign nevi   BENIGN LESIONS DISCUSSED WITH PATIENT:  I discussed the specifics of tumor, prognosis, and genetics of benign lesions.  I explained that treatment of these lesions would be purely cosmetic and not medically neccessary.  I discussed with patient different removal options including excision, cautery and /or laser.       Nature and genetics of benign skin lesions dicussed with patient.  Signs and Symptoms of skin cancer discussed with patient.  ABCDEs of melanoma reviewed with  patient.  Patient encouraged to perform monthly skin exams.  UV precautions reviewed with patient..    Risks of non-melanoma skin cancer discussed with patient   Return to clinic in one year or sooner if needed.

## 2023-08-31 ENCOUNTER — OFFICE VISIT (OUTPATIENT)
Dept: FAMILY MEDICINE | Facility: CLINIC | Age: 43
End: 2023-08-31
Payer: COMMERCIAL

## 2023-08-31 VITALS
HEART RATE: 67 BPM | OXYGEN SATURATION: 98 % | BODY MASS INDEX: 24.88 KG/M2 | HEIGHT: 71 IN | WEIGHT: 177.7 LBS | SYSTOLIC BLOOD PRESSURE: 122 MMHG | DIASTOLIC BLOOD PRESSURE: 74 MMHG | TEMPERATURE: 98.2 F | RESPIRATION RATE: 18 BRPM

## 2023-08-31 DIAGNOSIS — Z00.00 ROUTINE GENERAL MEDICAL EXAMINATION AT A HEALTH CARE FACILITY: Primary | ICD-10-CM

## 2023-08-31 DIAGNOSIS — Z13.220 LIPID SCREENING: ICD-10-CM

## 2023-08-31 LAB
CHOLEST SERPL-MCNC: 159 MG/DL
FASTING STATUS PATIENT QL REPORTED: YES
GLUCOSE SERPL-MCNC: 86 MG/DL (ref 70–99)
HDLC SERPL-MCNC: 58 MG/DL
LDLC SERPL CALC-MCNC: 93 MG/DL
NONHDLC SERPL-MCNC: 101 MG/DL
TRIGL SERPL-MCNC: 42 MG/DL

## 2023-08-31 PROCEDURE — 82947 ASSAY GLUCOSE BLOOD QUANT: CPT | Performed by: STUDENT IN AN ORGANIZED HEALTH CARE EDUCATION/TRAINING PROGRAM

## 2023-08-31 PROCEDURE — 99396 PREV VISIT EST AGE 40-64: CPT | Performed by: STUDENT IN AN ORGANIZED HEALTH CARE EDUCATION/TRAINING PROGRAM

## 2023-08-31 PROCEDURE — 36415 COLL VENOUS BLD VENIPUNCTURE: CPT | Performed by: STUDENT IN AN ORGANIZED HEALTH CARE EDUCATION/TRAINING PROGRAM

## 2023-08-31 PROCEDURE — 80061 LIPID PANEL: CPT | Performed by: STUDENT IN AN ORGANIZED HEALTH CARE EDUCATION/TRAINING PROGRAM

## 2023-08-31 ASSESSMENT — ENCOUNTER SYMPTOMS
PALPITATIONS: 0
FEVER: 0
ARTHRALGIAS: 0
HEADACHES: 0
COUGH: 0
HEMATURIA: 0
CHILLS: 0
FREQUENCY: 0
DIZZINESS: 0
NAUSEA: 0
SORE THROAT: 0
HEARTBURN: 0
JOINT SWELLING: 0
WEAKNESS: 0
ABDOMINAL PAIN: 0
EYE PAIN: 0
CONSTIPATION: 0
PARESTHESIAS: 0
SHORTNESS OF BREATH: 0
NERVOUS/ANXIOUS: 0
DYSURIA: 0
DIARRHEA: 0
HEMATOCHEZIA: 0
MYALGIAS: 0

## 2023-08-31 ASSESSMENT — PAIN SCALES - GENERAL: PAINLEVEL: NO PAIN (0)

## 2023-08-31 NOTE — PROGRESS NOTES
SUBJECTIVE:   CC: German is an 42 year old who presents for preventative health visit.       8/31/2023     8:13 AM   Additional Questions   Roomed by Coral SANCHES LPN   Accompanied by self         8/31/2023     8:13 AM   Patient Reported Additional Medications   Patient reports taking the following new medications no new meds       Healthy Habits:     Getting at least 3 servings of Calcium per day:  Yes    Bi-annual eye exam:  Yes    Dental care twice a year:  Yes    Sleep apnea or symptoms of sleep apnea:  None    Diet:  Breakfast skipped    Frequency of exercise:  2-3 days/week    Duration of exercise:  Less than 15 minutes    Taking medications regularly:  Yes    Barriers to taking medications:  None    Medication side effects:  Not applicable    Additional concerns today:  No      Today's PHQ-2 Score:       8/31/2023     7:50 AM   PHQ-2 ( 1999 [employer])   Q1: Little interest or pleasure in doing things 0   Q2: Feeling down, depressed or hopeless 0   PHQ-2 Score 0   Q1: Little interest or pleasure in doing things Not at all   Q2: Feeling down, depressed or hopeless Not at all   PHQ-2 Score 0       Social History     Tobacco Use    Smoking status: Never    Smokeless tobacco: Never   Substance Use Topics    Alcohol use: Yes     Comment: social 1 time a week              8/31/2023     7:50 AM   Alcohol Use   Prescreen: >3 drinks/day or >7 drinks/week? No       Last PSA: No results found for: PSA    Reviewed orders with patient. Reviewed health maintenance and updated orders accordingly - Yes  Lab work is in process    Reviewed and updated as needed this visit by clinical staff   Tobacco  Allergies  Meds              Reviewed and updated as needed this visit by Provider                   Review of Systems   Constitutional:  Negative for chills and fever.   HENT:  Negative for congestion, ear pain, hearing loss and sore throat.    Eyes:  Negative for pain and visual disturbance.   Respiratory:  Negative for cough and  "shortness of breath.    Cardiovascular:  Negative for chest pain, palpitations and peripheral edema.   Gastrointestinal:  Negative for abdominal pain, constipation, diarrhea, heartburn, hematochezia and nausea.   Genitourinary:  Negative for dysuria, frequency, genital sores, hematuria, impotence, penile discharge and urgency.   Musculoskeletal:  Negative for arthralgias, joint swelling and myalgias.   Skin:  Negative for rash.   Neurological:  Negative for dizziness, weakness, headaches and paresthesias.   Psychiatric/Behavioral:  Negative for mood changes. The patient is not nervous/anxious.        OBJECTIVE:   /74 (BP Location: Left arm, Patient Position: Sitting, Cuff Size: Adult Regular)   Pulse 67   Temp 98.2  F (36.8  C) (Tympanic)   Resp 18   Ht 1.81 m (5' 11.26\")   Wt 80.6 kg (177 lb 11.2 oz)   SpO2 98%   BMI 24.60 kg/m      Physical Exam  Constitutional:       General: He is not in acute distress.  HENT:      Head: Normocephalic and atraumatic.      Right Ear: Tympanic membrane, ear canal and external ear normal. There is no impacted cerumen.      Left Ear: Tympanic membrane, ear canal and external ear normal. There is no impacted cerumen.      Mouth/Throat:      Mouth: Mucous membranes are moist.      Pharynx: Oropharynx is clear. No oropharyngeal exudate or posterior oropharyngeal erythema.   Eyes:      General:         Right eye: No discharge.         Left eye: No discharge.      Extraocular Movements: Extraocular movements intact.      Conjunctiva/sclera: Conjunctivae normal.      Pupils: Pupils are equal, round, and reactive to light.      Comments: Some flecks in the iris bilaterally, but patient states that has been there is whole life    Cardiovascular:      Rate and Rhythm: Normal rate and regular rhythm.      Heart sounds: Normal heart sounds.   Pulmonary:      Effort: Pulmonary effort is normal. No respiratory distress.      Breath sounds: Normal breath sounds. No wheezing or " "rhonchi.   Abdominal:      Palpations: Abdomen is soft. There is no mass.      Tenderness: There is no abdominal tenderness.   Musculoskeletal:         General: No deformity. Normal range of motion.      Cervical back: Normal range of motion and neck supple.   Skin:     General: Skin is warm.      Findings: No rash.   Neurological:      General: No focal deficit present.      Mental Status: He is alert and oriented to person, place, and time.   Psychiatric:         Mood and Affect: Mood normal.     Diagnostic Test Results:  Labs reviewed in Epic    ASSESSMENT/PLAN:     1. Routine general medical examination at a health care facility  - REVIEW OF HEALTH MAINTENANCE PROTOCOL ORDERS  - Glucose; Future  - Glucose    2. Lipid screening  > patient has fasted for 12-13 hours   - Lipid panel reflex to direct LDL Fasting; Future  - Lipid panel reflex to direct LDL Fasting       Patient has been advised of split billing requirements and indicates understanding: Yes      COUNSELING:   Reviewed preventive health counseling, as reflected in patient instructions      BMI:   Estimated body mass index is 24.6 kg/m  as calculated from the following:    Height as of this encounter: 1.81 m (5' 11.26\").    Weight as of this encounter: 80.6 kg (177 lb 11.2 oz).         He reports that he has never smoked. He has never used smokeless tobacco.            FAMILIA WOODY MD  [facility]  "

## 2023-10-27 ENCOUNTER — IMMUNIZATION (OUTPATIENT)
Dept: FAMILY MEDICINE | Facility: CLINIC | Age: 43
End: 2023-10-27
Payer: COMMERCIAL

## 2023-10-27 PROCEDURE — 90686 IIV4 VACC NO PRSV 0.5 ML IM: CPT

## 2023-10-27 PROCEDURE — 90480 ADMN SARSCOV2 VAC 1/ONLY CMP: CPT

## 2023-10-27 PROCEDURE — 91320 SARSCV2 VAC 30MCG TRS-SUC IM: CPT

## 2023-10-27 PROCEDURE — 90471 IMMUNIZATION ADMIN: CPT

## 2023-12-05 ENCOUNTER — OFFICE VISIT (OUTPATIENT)
Dept: DERMATOLOGY | Facility: CLINIC | Age: 43
End: 2023-12-05
Payer: COMMERCIAL

## 2023-12-05 DIAGNOSIS — L81.4 LENTIGO: ICD-10-CM

## 2023-12-05 DIAGNOSIS — L82.1 SEBORRHEIC KERATOSIS: ICD-10-CM

## 2023-12-05 DIAGNOSIS — D22.9 MULTIPLE BENIGN NEVI: ICD-10-CM

## 2023-12-05 DIAGNOSIS — L20.9 ATOPIC DERMATITIS, UNSPECIFIED TYPE: Primary | ICD-10-CM

## 2023-12-05 DIAGNOSIS — Z87.898 HISTORY OF ATYPICAL NEVUS: ICD-10-CM

## 2023-12-05 DIAGNOSIS — D18.01 CHERRY ANGIOMA: ICD-10-CM

## 2023-12-05 PROCEDURE — 99213 OFFICE O/P EST LOW 20 MIN: CPT | Performed by: PHYSICIAN ASSISTANT

## 2023-12-05 RX ORDER — TRIAMCINOLONE ACETONIDE 1 MG/G
CREAM TOPICAL 2 TIMES DAILY
Qty: 80 G | Refills: 1 | Status: SHIPPED | OUTPATIENT
Start: 2023-12-05

## 2023-12-05 ASSESSMENT — PAIN SCALES - GENERAL: PAINLEVEL: NO PAIN (0)

## 2023-12-05 NOTE — PROGRESS NOTES
Marcin Santo is an extremely pleasant 42 year old year old male patient here today for skin check. He denies any new or changing nevi. No painful or bleeding skin lesions. He also notes rash on hips similar to previous rash. He notes resolved on low abdomen. Asymptomatic, he has tried antifungal and cortisone with improvements.  Patient has no other skin complaints today.  Remainder of the HPI, Meds, PMH, Allergies, FH, and SH was reviewed in chart.    Pertinent Hx:  History of atypical nevus on right upper back 12/2019  No past medical history on file.    Past Surgical History:   Procedure Laterality Date    APPENDECTOMY OPEN  01/01/1997    BIOPSY  2019?    Mole removed and biopsy performed        Family History   Problem Relation Age of Onset    Diabetes Father     Diabetes Paternal Grandfather     Breast Cancer Paternal Grandfather     Prostate Cancer Paternal Grandfather     Melanoma No family hx of        Social History     Socioeconomic History    Marital status:      Spouse name: Not on file    Number of children: Not on file    Years of education: Not on file    Highest education level: Not on file   Occupational History    Not on file   Tobacco Use    Smoking status: Never    Smokeless tobacco: Never   Vaping Use    Vaping Use: Never used   Substance and Sexual Activity    Alcohol use: Yes     Comment: social 1 time a week     Drug use: No    Sexual activity: Yes     Partners: Female   Other Topics Concern    Parent/sibling w/ CABG, MI or angioplasty before 65F 55M? No     Service No    Blood Transfusions No    Caffeine Concern No    Occupational Exposure No    Hobby Hazards No    Sleep Concern No    Stress Concern No    Weight Concern No    Special Diet No    Back Care No    Exercise Yes    Bike Helmet Yes    Seat Belt Yes    Self-Exams Yes   Social History Narrative    Not on file     Social Determinants of Health     Financial Resource Strain: Not on file   Food Insecurity: Not on file    Transportation Needs: Not on file   Physical Activity: Not on file   Stress: Not on file   Social Connections: Not on file   Interpersonal Safety: Not on file   Housing Stability: Not on file       Outpatient Encounter Medications as of 12/5/2023   Medication Sig Dispense Refill    triamcinolone (KENALOG) 0.1 % external cream Apply topically 2 times daily Apply sparingly twice daily for next 3 weeks. 80 g 1    NO ACTIVE MEDICATIONS        No facility-administered encounter medications on file as of 12/5/2023.             O:   NAD, WDWN, Alert & Oriented, Mood & Affect wnl, Vitals stable   Here today alone   There were no vitals taken for this visit.   General appearance normal   Vitals stable   Alert, oriented and in no acute distress       Well healed circular scar on right upper back   Stuck on papules and brown macules on trunk and ext   Red papules on trunk  Brown papules and macules with regular pigment network and borders on torso and extremities   Mild pink dry thin plaques on hips   The remainder of skin exam.          Eyes: Conjunctivae/lids:Normal     ENT: Lips: normal    MSK:Normal    Cardiovascular: peripheral edema none    Pulm: Breathing Normal    Neuro/Psych: Orientation:Alert and Orientedx3 ; Mood/Affect:normal     A/P:  1. History of severely atypical nevus   No evidence or recurrence.   2. atopic dermatitis on hips    Clinically looks like irritant vs atopic dermatitis  Use daily moisturizers.   apply triamcinolone  as needed, use sparingly for next 3 weeks.   Please let me know if does not resolve.   If not resolving will biopsy.   3. Seborrheic keratosis, lentigo, angioma, benign nevi   BENIGN LESIONS DISCUSSED WITH PATIENT:  I discussed the specifics of tumor, prognosis, and genetics of benign lesions.  I explained that treatment of these lesions would be purely cosmetic and not medically neccessary.  I discussed with patient different removal options including excision, cautery and /or laser.        Nature and genetics of benign skin lesions dicussed with patient.  Signs and Symptoms of skin cancer discussed with patient.  ABCDEs of melanoma reviewed with patient.  Patient encouraged to perform monthly skin exams.  UV precautions reviewed with patient..    Risks of non-melanoma skin cancer discussed with patient   Return to clinic in one year or sooner if needed.

## 2023-12-05 NOTE — NURSING NOTE
Chief Complaint   Patient presents with    Skin Check     Spots on hips that are just there, no pain no itch        There were no vitals filed for this visit.  Wt Readings from Last 1 Encounters:   08/31/23 80.6 kg (177 lb 11.2 oz)       Joanne Vieyra LPN .................12/5/2023

## 2023-12-05 NOTE — LETTER
12/5/2023         RE: Marcin Santo  [id number] [facility] [address]        Dear Colleague,    Thank you for referring your patient, Marcin Santo, to the [facility]. Please see a copy of my visit note below.    Marcin Santo is an extremely pleasant 42 year old year old male patient here today for skin check. He denies any new or changing nevi. No painful or bleeding skin lesions. He also notes rash on hips similar to previous rash. He notes resolved on low abdomen. Asymptomatic, he has tried antifungal and cortisone with improvements.  Patient has no other skin complaints today.  Remainder of the HPI, Meds, PMH, Allergies, FH, and SH was reviewed in chart.    Pertinent Hx:  History of atypical nevus on right upper back 12/2019  No past medical history on file.    Past Surgical History:   Procedure Laterality Date     APPENDECTOMY OPEN  01/01/1997     BIOPSY  2019?    Mole removed and biopsy performed        Family History   Problem Relation Age of Onset     Diabetes Father      Diabetes Paternal Grandfather      Breast Cancer Paternal Grandfather      Prostate Cancer Paternal Grandfather      Melanoma No family hx of        Social History     Socioeconomic History     Marital status:      Spouse name: Not on file     Number of children: Not on file     Years of education: Not on file     Highest education level: Not on file   Occupational History     Not on file   Tobacco Use     Smoking status: Never     Smokeless tobacco: Never   Vaping Use     Vaping Use: Never used   Substance and Sexual Activity     Alcohol use: Yes     Comment: social 1 time a week      Drug use: No     Sexual activity: Yes     Partners: Female   Other Topics Concern     Parent/sibling w/ CABG, MI or angioplasty before 65F 55M? No      Service No     Blood Transfusions No     Caffeine Concern No     Occupational Exposure No     Hobby Hazards No     Sleep Concern No     Stress Concern No      Weight Concern No     Special Diet No     Back Care No     Exercise Yes     Bike Helmet Yes     Seat Belt Yes     Self-Exams Yes   Social History Narrative     Not on file     Social Determinants of Health     Financial Resource Strain: Not on file   Food Insecurity: Not on file   Transportation Needs: Not on file   Physical Activity: Not on file   Stress: Not on file   Social Connections: Not on file   Interpersonal Safety: Not on file   Housing Stability: Not on file       Outpatient Encounter Medications as of 12/5/2023   Medication Sig Dispense Refill     triamcinolone (KENALOG) 0.1 % external cream Apply topically 2 times daily Apply sparingly twice daily for next 3 weeks. 80 g 1     NO ACTIVE MEDICATIONS        No facility-administered encounter medications on file as of 12/5/2023.             O:   NAD, WDWN, Alert & Oriented, Mood & Affect wnl, Vitals stable   Here today alone   There were no vitals taken for this visit.   General appearance normal   Vitals stable   Alert, oriented and in no acute distress       Well healed circular scar on right upper back   Stuck on papules and brown macules on trunk and ext   Red papules on trunk  Brown papules and macules with regular pigment network and borders on torso and extremities   Mild pink dry thin plaques on hips   The remainder of skin exam.          Eyes: Conjunctivae/lids:Normal     ENT: Lips: normal    MSK:Normal    Cardiovascular: peripheral edema none    Pulm: Breathing Normal    Neuro/Psych: Orientation:Alert and Orientedx3 ; Mood/Affect:normal     A/P:  1. History of severely atypical nevus   No evidence or recurrence.   2. atopic dermatitis on hips    Clinically looks like irritant vs atopic dermatitis  Use daily moisturizers.   apply triamcinolone  as needed, use sparingly for next 3 weeks.   Please let me know if does not resolve.   If not resolving will biopsy.   3. Seborrheic keratosis, lentigo, angioma, benign nevi   BENIGN LESIONS DISCUSSED WITH  PATIENT:  I discussed the specifics of tumor, prognosis, and genetics of benign lesions.  I explained that treatment of these lesions would be purely cosmetic and not medically neccessary.  I discussed with patient different removal options including excision, cautery and /or laser.       Nature and genetics of benign skin lesions dicussed with patient.  Signs and Symptoms of skin cancer discussed with patient.  ABCDEs of melanoma reviewed with patient.  Patient encouraged to perform monthly skin exams.  UV precautions reviewed with patient..    Risks of non-melanoma skin cancer discussed with patient   Return to clinic in one year or sooner if needed.       Again, thank you for allowing me to participate in the care of your patient.        Sincerely,        Denice Snowden PA-C

## 2024-06-27 ENCOUNTER — OFFICE VISIT (OUTPATIENT)
Dept: FAMILY MEDICINE | Facility: CLINIC | Age: 44
End: 2024-06-27
Payer: COMMERCIAL

## 2024-06-27 VITALS
HEIGHT: 71 IN | WEIGHT: 180 LBS | TEMPERATURE: 97.2 F | HEART RATE: 66 BPM | SYSTOLIC BLOOD PRESSURE: 110 MMHG | RESPIRATION RATE: 16 BRPM | DIASTOLIC BLOOD PRESSURE: 80 MMHG | OXYGEN SATURATION: 99 % | BODY MASS INDEX: 25.2 KG/M2

## 2024-06-27 DIAGNOSIS — N63.24 MASS OF LOWER INNER QUADRANT OF LEFT BREAST: Primary | ICD-10-CM

## 2024-06-27 DIAGNOSIS — Z13.220 LIPID SCREENING: ICD-10-CM

## 2024-06-27 LAB
ALBUMIN SERPL BCG-MCNC: 4.5 G/DL (ref 3.5–5.2)
ALP SERPL-CCNC: 51 U/L (ref 40–150)
ALT SERPL W P-5'-P-CCNC: 19 U/L (ref 0–70)
ANION GAP SERPL CALCULATED.3IONS-SCNC: 12 MMOL/L (ref 7–15)
AST SERPL W P-5'-P-CCNC: 20 U/L (ref 0–45)
BASOPHILS # BLD AUTO: 0 10E3/UL (ref 0–0.2)
BASOPHILS NFR BLD AUTO: 1 %
BILIRUB SERPL-MCNC: 0.7 MG/DL
BUN SERPL-MCNC: 16 MG/DL (ref 6–20)
CALCIUM SERPL-MCNC: 9.5 MG/DL (ref 8.6–10)
CHLORIDE SERPL-SCNC: 103 MMOL/L (ref 98–107)
CHOLEST SERPL-MCNC: 178 MG/DL
CREAT SERPL-MCNC: 1.08 MG/DL (ref 0.67–1.17)
DEPRECATED HCO3 PLAS-SCNC: 24 MMOL/L (ref 22–29)
EGFRCR SERPLBLD CKD-EPI 2021: 87 ML/MIN/1.73M2
EOSINOPHIL # BLD AUTO: 0.1 10E3/UL (ref 0–0.7)
EOSINOPHIL NFR BLD AUTO: 2 %
ERYTHROCYTE [DISTWIDTH] IN BLOOD BY AUTOMATED COUNT: 11.9 % (ref 10–15)
ESTRADIOL SERPL-MCNC: 18 PG/ML
FASTING STATUS PATIENT QL REPORTED: YES
FASTING STATUS PATIENT QL REPORTED: YES
GLUCOSE SERPL-MCNC: 89 MG/DL (ref 70–99)
HCT VFR BLD AUTO: 44.5 % (ref 40–53)
HDLC SERPL-MCNC: 65 MG/DL
HGB BLD-MCNC: 15.2 G/DL (ref 13.3–17.7)
IMM GRANULOCYTES # BLD: 0 10E3/UL
IMM GRANULOCYTES NFR BLD: 0 %
LDLC SERPL CALC-MCNC: 103 MG/DL
LH SERPL-ACNC: 4.7 MIU/ML (ref 1.7–8.6)
LYMPHOCYTES # BLD AUTO: 1.2 10E3/UL (ref 0.8–5.3)
LYMPHOCYTES NFR BLD AUTO: 33 %
MCH RBC QN AUTO: 30.2 PG (ref 26.5–33)
MCHC RBC AUTO-ENTMCNC: 34.2 G/DL (ref 31.5–36.5)
MCV RBC AUTO: 88 FL (ref 78–100)
MONOCYTES # BLD AUTO: 0.2 10E3/UL (ref 0–1.3)
MONOCYTES NFR BLD AUTO: 6 %
NEUTROPHILS # BLD AUTO: 2.1 10E3/UL (ref 1.6–8.3)
NEUTROPHILS NFR BLD AUTO: 59 %
NONHDLC SERPL-MCNC: 113 MG/DL
PLATELET # BLD AUTO: 175 10E3/UL (ref 150–450)
POTASSIUM SERPL-SCNC: 4.5 MMOL/L (ref 3.4–5.3)
PROT SERPL-MCNC: 7.4 G/DL (ref 6.4–8.3)
RBC # BLD AUTO: 5.04 10E6/UL (ref 4.4–5.9)
SHBG SERPL-SCNC: 52 NMOL/L (ref 11–80)
SODIUM SERPL-SCNC: 139 MMOL/L (ref 135–145)
TRIGL SERPL-MCNC: 48 MG/DL
TSH SERPL DL<=0.005 MIU/L-ACNC: 1.79 UIU/ML (ref 0.3–4.2)
WBC # BLD AUTO: 3.6 10E3/UL (ref 4–11)

## 2024-06-27 PROCEDURE — 36415 COLL VENOUS BLD VENIPUNCTURE: CPT | Performed by: FAMILY MEDICINE

## 2024-06-27 PROCEDURE — 84270 ASSAY OF SEX HORMONE GLOBUL: CPT | Performed by: FAMILY MEDICINE

## 2024-06-27 PROCEDURE — 84403 ASSAY OF TOTAL TESTOSTERONE: CPT | Performed by: FAMILY MEDICINE

## 2024-06-27 PROCEDURE — 82670 ASSAY OF TOTAL ESTRADIOL: CPT | Performed by: FAMILY MEDICINE

## 2024-06-27 PROCEDURE — 83002 ASSAY OF GONADOTROPIN (LH): CPT | Performed by: FAMILY MEDICINE

## 2024-06-27 PROCEDURE — 99214 OFFICE O/P EST MOD 30 MIN: CPT | Performed by: FAMILY MEDICINE

## 2024-06-27 PROCEDURE — 84443 ASSAY THYROID STIM HORMONE: CPT | Performed by: FAMILY MEDICINE

## 2024-06-27 PROCEDURE — 80061 LIPID PANEL: CPT | Performed by: FAMILY MEDICINE

## 2024-06-27 PROCEDURE — 84702 CHORIONIC GONADOTROPIN TEST: CPT | Performed by: FAMILY MEDICINE

## 2024-06-27 PROCEDURE — 85025 COMPLETE CBC W/AUTO DIFF WBC: CPT | Performed by: FAMILY MEDICINE

## 2024-06-27 PROCEDURE — 80053 COMPREHEN METABOLIC PANEL: CPT | Performed by: FAMILY MEDICINE

## 2024-06-27 ASSESSMENT — PAIN SCALES - GENERAL: PAINLEVEL: NO PAIN (0)

## 2024-06-27 NOTE — PROGRESS NOTES
Assessment & Plan     Mass of lower inner quadrant of left breast  Maternal grandfather was diagnosed with breast cancer in his 50s.  Has noticed left sided breast fullness worse over the last month, does think it might have been present about a year ago as well.  It is asymmetric to his other side.  No trauma to the area, overlying skin change or tenderness.  Area of fullness felt on exam, no definitive borders appreciated.  Recommend imaging and lab evaluation as below.  Cosmetically does not bother him he just wants to ensure that no underlying cancer  - MA Diagnostic Digital Bilateral  - US Breast Left Limited 1-3 Quadrants  - Comprehensive metabolic panel (BMP + Alb, Alk Phos, ALT, AST, Total. Bili, TP)  - CBC with platelets and differential  - TSH with free T4 reflex  - Luteinizing Hormone  - HCG tumor marker  - Estradiol  - Sex Hormone Binding Globulin  - Testosterone Free and Total    Lipid screening  - Lipid panel reflex to direct LDL Fasting          Wilfredo Porter is a 43 year old, presenting for the following health issues:  Breast Mass        6/27/2024     7:46 AM   Additional Questions   Roomed by Estefany ALVARADO CMA     History of Present Illness       Reason for visit:  Lump in LH side of chest  Symptom onset:  3-4 weeks ago  Symptoms include:  Na  Symptom intensity:  Mild  Symptom progression:  Staying the same  Had these symptoms before:  No  What makes it worse:  No  What makes it better:  No    He eats 2-3 servings of fruits and vegetables daily.He consumes 0 sweetened beverage(s) daily.He exercises with enough effort to increase his heart rate 9 or less minutes per day.  He exercises with enough effort to increase his heart rate 3 or less days per week.   He is taking medications regularly.     Noticed for a month or so-does not   No other symptoms   Happened to feel chest and noticed it-on left breast   No trauma to the area       Review of Systems  Constitutional, HEENT, cardiovascular,  "pulmonary, gi and gu systems are negative, except as otherwise noted.      Objective    /80   Pulse 66   Temp 97.2  F (36.2  C) (Tympanic)   Resp 16   Ht 1.81 m (5' 11.25\")   Wt 81.6 kg (180 lb)   SpO2 99%   BMI 24.93 kg/m    Body mass index is 24.93 kg/m .  Physical Exam  Constitutional:       Appearance: Normal appearance.   Eyes:      Conjunctiva/sclera: Conjunctivae normal.   Cardiovascular:      Rate and Rhythm: Normal rate and regular rhythm.   Pulmonary:      Effort: Pulmonary effort is normal.      Breath sounds: Normal breath sounds.   Chest:   Breasts:     Right: Absent. No nipple discharge, skin change or tenderness.      Left: Mass present. No swelling, nipple discharge, skin change or tenderness.   Abdominal:      General: Abdomen is flat.   Lymphadenopathy:      Upper Body:      Right upper body: No supraclavicular, axillary or pectoral adenopathy.      Left upper body: No supraclavicular, axillary or pectoral adenopathy.   Skin:     General: Skin is warm and dry.   Neurological:      Mental Status: He is alert.   Psychiatric:         Thought Content: Thought content normal.         Judgment: Judgment normal.          Signed Electronically by: GUSTABO MARTINEZ DO    "

## 2024-06-27 NOTE — NURSING NOTE
"Initial /80   Pulse 66   Temp 97.2  F (36.2  C) (Tympanic)   Resp 16   Ht 1.81 m (5' 11.25\")   Wt 81.6 kg (180 lb)   SpO2 99%   BMI 24.93 kg/m   Estimated body mass index is 24.93 kg/m  as calculated from the following:    Height as of this encounter: 1.81 m (5' 11.25\").    Weight as of this encounter: 81.6 kg (180 lb). .    "

## 2024-06-28 LAB — HCG-TM SERPL-ACNC: <3 IU/L

## 2024-06-30 LAB
TESTOST FREE SERPL-MCNC: 8.16 NG/DL
TESTOST SERPL-MCNC: 522 NG/DL (ref 240–950)

## 2024-07-18 ENCOUNTER — HOSPITAL ENCOUNTER (OUTPATIENT)
Dept: MAMMOGRAPHY | Facility: CLINIC | Age: 44
Discharge: HOME | End: 2024-07-18
Attending: FAMILY MEDICINE
Payer: COMMERCIAL

## 2024-07-18 ENCOUNTER — HOSPITAL ENCOUNTER (OUTPATIENT)
Dept: ULTRASOUND IMAGING | Facility: CLINIC | Age: 44
Discharge: HOME | End: 2024-07-18
Attending: FAMILY MEDICINE
Payer: COMMERCIAL

## 2024-07-18 DIAGNOSIS — N63.24 MASS OF LOWER INNER QUADRANT OF LEFT BREAST: ICD-10-CM

## 2024-07-18 PROCEDURE — 77066 DX MAMMO INCL CAD BI: CPT

## 2024-07-18 PROCEDURE — 76642 ULTRASOUND BREAST LIMITED: CPT | Mod: LT

## 2024-07-22 ENCOUNTER — MYC MEDICAL ADVICE (OUTPATIENT)
Dept: FAMILY MEDICINE | Facility: CLINIC | Age: 44
End: 2024-07-22
Payer: COMMERCIAL

## 2024-10-08 ENCOUNTER — OFFICE VISIT (OUTPATIENT)
Dept: FAMILY MEDICINE | Facility: CLINIC | Age: 44
End: 2024-10-08
Payer: COMMERCIAL

## 2024-10-08 VITALS
DIASTOLIC BLOOD PRESSURE: 78 MMHG | BODY MASS INDEX: 25.37 KG/M2 | TEMPERATURE: 97.7 F | OXYGEN SATURATION: 99 % | SYSTOLIC BLOOD PRESSURE: 102 MMHG | HEIGHT: 71 IN | RESPIRATION RATE: 16 BRPM | HEART RATE: 55 BPM | WEIGHT: 181.2 LBS

## 2024-10-08 DIAGNOSIS — Z00.00 ROUTINE PHYSICAL EXAMINATION: Primary | ICD-10-CM

## 2024-10-08 PROCEDURE — 90472 IMMUNIZATION ADMIN EACH ADD: CPT | Performed by: NURSE PRACTITIONER

## 2024-10-08 PROCEDURE — 90480 ADMN SARSCOV2 VAC 1/ONLY CMP: CPT | Performed by: NURSE PRACTITIONER

## 2024-10-08 PROCEDURE — 99396 PREV VISIT EST AGE 40-64: CPT | Mod: 25 | Performed by: NURSE PRACTITIONER

## 2024-10-08 PROCEDURE — 90656 IIV3 VACC NO PRSV 0.5 ML IM: CPT | Performed by: NURSE PRACTITIONER

## 2024-10-08 PROCEDURE — 91320 SARSCV2 VAC 30MCG TRS-SUC IM: CPT | Performed by: NURSE PRACTITIONER

## 2024-10-08 SDOH — HEALTH STABILITY: PHYSICAL HEALTH: ON AVERAGE, HOW MANY DAYS PER WEEK DO YOU ENGAGE IN MODERATE TO STRENUOUS EXERCISE (LIKE A BRISK WALK)?: 2 DAYS

## 2024-10-08 ASSESSMENT — SOCIAL DETERMINANTS OF HEALTH (SDOH): HOW OFTEN DO YOU GET TOGETHER WITH FRIENDS OR RELATIVES?: ONCE A WEEK

## 2024-10-08 NOTE — PROGRESS NOTES
"Preventive Care Visit  [facility]  PHILL Pope CNP, Family Medicine  Oct 8, 2024      Assessment & Plan     Routine physical examination          BMI  Estimated body mass index is 25.63 kg/m  as calculated from the following:    Height as of this encounter: 1.791 m (5' 10.5\").    Weight as of this encounter: 82.2 kg (181 lb 3.2 oz).   Weight management plan: Discussed healthy diet and exercise guidelines    Counseling  Appropriate preventive services were addressed with this patient via screening, questionnaire, or discussion as appropriate for fall prevention, nutrition, physical activity, Tobacco-use cessation, social engagement, weight loss and cognition.  Checklist reviewing preventive services available has been given to the patient.  Reviewed patient's diet, addressing concerns and/or questions.   He is at risk for lack of exercise and has been provided with information to increase physical activity for the benefit of his well-being.       FUTURE APPOINTMENTS:       - Follow-up for annual visit or as needed    Subjective   German is a 43 year old, presenting for the following:  Physical        10/8/2024     9:09 AM   Additional Questions   Roomed by Nicolle BUTLER        Health Care Directive  Patient does not have a Health Care Directive or Living Will: Patient states has Advance Directive and will bring in a copy to clinic.    HPI            10/8/2024   General Health   How would you rate your overall physical health? Good   Feel stress (tense, anxious, or unable to sleep) Not at all            10/8/2024   Nutrition   Three or more servings of calcium each day? (!) I DON'T KNOW   Diet: Breakfast skipped   How many servings of fruit and vegetables per day? (!) 0-1   How many sweetened beverages each day? 0-1            10/8/2024   Exercise   Days per week of moderate/strenous exercise 2 days      (!) EXERCISE CONCERN      10/8/2024   Social Factors   Frequency of gathering with " friends or relatives Once a week   Worry food won't last until get money to buy more No   Food not last or not have enough money for food? No   Do you have housing? (Housing is defined as stable permanent housing and does not include staying ouside in a car, in a tent, in an abandoned building, in an overnight shelter, or couch-surfing.) Yes   Are you worried about losing your housing? No   Lack of transportation? No   Unable to get utilities (heat,electricity)? No            10/8/2024   Dental   Dentist two times every year? Yes            10/8/2024   TB Screening   Were you born outside of the [place]? No                  10/8/2024   Substance Use   Alcohol more than 3/day or more than 7/wk No   Do you use any other substances recreationally? No        Social History     Tobacco Use    Smoking status: Never    Smokeless tobacco: Never   Vaping Use    Vaping status: Never Used   Substance Use Topics    Alcohol use: Yes     Comment: social 1 time a week     Drug use: No           10/8/2024   STI Screening   New sexual partner(s) since last STI/HIV test? No      ASCVD Risk   The 10-year ASCVD risk score (Blake DYER, et al., 2019) is: 0.6%    Values used to calculate the score:      Age: 43 years      Sex: Male      Is Non- : No      Diabetic: No      Tobacco smoker: No      Systolic Blood Pressure: 102 mmHg      Is BP treated: No      HDL Cholesterol: 65 mg/dL      Total Cholesterol: 178 mg/dL        10/8/2024   Contraception/Family Planning   Questions about contraception or family planning No           Reviewed and updated as needed this visit by Provider                    Labs reviewed in EPIC  BP Readings from Last 3 Encounters:   10/08/24 102/78   06/27/24 110/80   08/31/23 122/74    Wt Readings from Last 3 Encounters:   10/08/24 82.2 kg (181 lb 3.2 oz)   06/27/24 81.6 kg (180 lb)   08/31/23 80.6 kg (177 lb 11.2 oz)                      Review of Systems  Constitutional, HEENT,  "cardiovascular, pulmonary, gi and gu systems are negative, except as otherwise noted.     Objective    Exam  /78   Pulse 55   Temp 97.7  F (36.5  C) (Tympanic)   Resp 16   Ht 1.791 m (5' 10.5\")   Wt 82.2 kg (181 lb 3.2 oz)   SpO2 99%   BMI 25.63 kg/m     Estimated body mass index is 25.63 kg/m  as calculated from the following:    Height as of this encounter: 1.791 m (5' 10.5\").    Weight as of this encounter: 82.2 kg (181 lb 3.2 oz).    Physical Exam  GENERAL: alert and no distress  EYES: Eyes grossly normal to inspection and conjunctivae and sclerae normal  HENT: normal cephalic/atraumatic, ear canals and TM's normal, oropharynx clear, and oral mucous membranes moist  NECK: no adenopathy, no asymmetry, masses, or scars  RESP: lungs clear to auscultation - no rales, rhonchi or wheezes  CV: regular rate and rhythm, normal S1 S2, no S3 or S4, no murmur, click or rub, no peripheral edema  ABDOMEN: soft, nontender and no palpable or pulsatile masses  MS: no gross musculoskeletal defects noted, no edema  SKIN: no suspicious lesions or rashes  NEURO: Normal strength and tone, mentation intact and speech normal  PSYCH: mentation appears normal, affect normal/bright        Signed Electronically by: PHILL Pope CNP    "

## 2024-12-03 ENCOUNTER — OFFICE VISIT (OUTPATIENT)
Dept: DERMATOLOGY | Facility: CLINIC | Age: 44
End: 2024-12-03
Payer: COMMERCIAL

## 2024-12-03 DIAGNOSIS — L20.9 ATOPIC DERMATITIS, UNSPECIFIED TYPE: ICD-10-CM

## 2024-12-03 DIAGNOSIS — Z87.898 HISTORY OF ATYPICAL NEVUS: ICD-10-CM

## 2024-12-03 DIAGNOSIS — D18.01 CHERRY ANGIOMA: Primary | ICD-10-CM

## 2024-12-03 DIAGNOSIS — L81.4 LENTIGO: ICD-10-CM

## 2024-12-03 DIAGNOSIS — D22.9 MULTIPLE BENIGN NEVI: ICD-10-CM

## 2024-12-03 DIAGNOSIS — L82.1 SEBORRHEIC KERATOSIS: ICD-10-CM

## 2024-12-03 PROCEDURE — 99213 OFFICE O/P EST LOW 20 MIN: CPT | Performed by: PHYSICIAN ASSISTANT

## 2024-12-03 PROCEDURE — G2211 COMPLEX E/M VISIT ADD ON: HCPCS | Performed by: PHYSICIAN ASSISTANT

## 2024-12-03 ASSESSMENT — PAIN SCALES - GENERAL: PAINLEVEL_OUTOF10: NO PAIN (0)

## 2024-12-03 NOTE — NURSING NOTE
Chief Complaint   Patient presents with    Skin Check     No Concerns        There were no vitals filed for this visit.  Wt Readings from Last 1 Encounters:   10/08/24 82.2 kg (181 lb 3.2 oz)       Joanne Vieyra LPN .................12/3/2024

## 2024-12-03 NOTE — PROGRESS NOTES
Marcin Santo is a pleasant 43 year old year old male patient here today for skin check. He denies any new or changing nevi. No painful or bleeding skin lesions. No rash currently. He notes does occur periodically but triamcinolone works. Does not need refills at this time. Patient has no other skin complaints today.  Remainder of the HPI, Meds, PMH, Allergies, FH, and SH was reviewed in chart.    Pertinent Hx:  History of atypical nevus on right upper back 12/2019  No past medical history on file.    Past Surgical History:   Procedure Laterality Date    APPENDECTOMY OPEN  01/01/1997    BIOPSY  2019?    Mole removed and biopsy performed        Family History   Problem Relation Age of Onset    Diabetes Father     Diabetes Paternal Grandfather     Breast Cancer Paternal Grandfather     Prostate Cancer Paternal Grandfather     Melanoma No family hx of        Social History     Socioeconomic History    Marital status:      Spouse name: Not on file    Number of children: Not on file    Years of education: Not on file    Highest education level: Not on file   Occupational History    Not on file   Tobacco Use    Smoking status: Never    Smokeless tobacco: Never   Vaping Use    Vaping status: Never Used   Substance and Sexual Activity    Alcohol use: Yes     Comment: social 1 time a week     Drug use: No    Sexual activity: Yes     Partners: Female   Other Topics Concern    Parent/sibling w/ CABG, MI or angioplasty before 65F 55M? No     Service No    Blood Transfusions No    Caffeine Concern No    Occupational Exposure No    Hobby Hazards No    Sleep Concern No    Stress Concern No    Weight Concern No    Special Diet No    Back Care No    Exercise Yes    Bike Helmet Yes    Seat Belt Yes    Self-Exams Yes   Social History Narrative    Not on file     Social Drivers of Health     Financial Resource Strain: Low Risk  (10/8/2024)    Financial Resource Strain     Within the past 12 months, have you or your  family members you live with been unable to get utilities (heat, electricity) when it was really needed?: No   Food Insecurity: Low Risk  (10/8/2024)    Food Insecurity     Within the past 12 months, did you worry that your food would run out before you got money to buy more?: No     Within the past 12 months, did the food you bought just not last and you didn t have money to get more?: No   Transportation Needs: Low Risk  (10/8/2024)    Transportation Needs     Within the past 12 months, has lack of transportation kept you from medical appointments, getting your medicines, non-medical meetings or appointments, work, or from getting things that you need?: No   Physical Activity: Unknown (10/8/2024)    Exercise Vital Sign     Days of Exercise per Week: 2 days     Minutes of Exercise per Session: Not on file   Stress: No Stress Concern Present (10/8/2024)    [employer] [place] [employer] - Occupational Stress Questionnaire     Feeling of Stress : Not at all   Social Connections: Unknown (10/8/2024)    Social Connection and Isolation Panel [NHANES]     Frequency of Communication with Friends and Family: Not on file     Frequency of Social Gatherings with Friends and Family: Once a week     Attends [employer] Services: Not on file     Active Member of Clubs or Organizations: Not on file     Attends Club or Organization Meetings: Not on file     Marital Status: Not on file   Interpersonal Safety: Low Risk  (10/8/2024)    Interpersonal Safety     Do you feel physically and emotionally safe where you currently live?: Yes     Within the past 12 months, have you been hit, slapped, kicked or otherwise physically hurt by someone?: No     Within the past 12 months, have you been humiliated or emotionally abused in other ways by your partner or ex-partner?: No   Housing Stability: Low Risk  (10/8/2024)    Housing Stability     Do you have housing? : Yes     Are you worried about losing your housing?: No        Outpatient Encounter Medications as of 12/3/2024   Medication Sig Dispense Refill    triamcinolone (KENALOG) 0.1 % external cream Apply topically 2 times daily Apply sparingly twice daily for next 3 weeks. 80 g 1     No facility-administered encounter medications on file as of 12/3/2024.             O:   NAD, WDWN, Alert & Oriented, Mood & Affect wnl, Vitals stable   Here today alone   There were no vitals taken for this visit.   General appearance normal   Vitals stable   Alert, oriented and in no acute distress       Well healed circular scar on right upper back   Stuck on papules and brown macules on trunk and ext   Red papules on trunk  Brown papules and macules with regular pigment network and borders on torso and extremities   The remainder of skin exam.          Eyes: Conjunctivae/lids:Normal     ENT: Lips: normal    MSK:Normal    Cardiovascular: peripheral edema none    Pulm: Breathing Normal    Neuro/Psych: Orientation:Alert and Orientedx3 ; Mood/Affect:normal     A/P:  1. History of severely atypical nevus   No evidence or recurrence.   2. atopic dermatitis on hips    Appears clear today    3. Seborrheic keratosis, lentigo, angioma, benign nevi   BENIGN LESIONS DISCUSSED WITH PATIENT:  I discussed the specifics of tumor, prognosis, and genetics of benign lesions.  I explained that treatment of these lesions would be purely cosmetic and not medically neccessary.  I discussed with patient different removal options including excision, cautery and /or laser.       Nature and genetics of benign skin lesions dicussed with patient.  Signs and Symptoms of skin cancer discussed with patient.  ABCDEs of melanoma reviewed with patient.  Patient encouraged to perform monthly skin exams.  UV precautions reviewed with patient..    Risks of non-melanoma skin cancer discussed with patient   Return to clinic in one year or sooner if needed.

## 2024-12-03 NOTE — LETTER
12/3/2024      Marcin Santo  [id number] [facility] [address]      Dear Colleague,    Thank you for referring your patient, Marcin Santo, to the [facility]. Please see a copy of my visit note below.    Marcin Santo is a pleasant 43 year old year old male patient here today for skin check. He denies any new or changing nevi. No painful or bleeding skin lesions. No rash currently. He notes does occur periodically but triamcinolone works. Does not need refills at this time. Patient has no other skin complaints today.  Remainder of the HPI, Meds, PMH, Allergies, FH, and SH was reviewed in chart.    Pertinent Hx:  History of atypical nevus on right upper back 12/2019  No past medical history on file.    Past Surgical History:   Procedure Laterality Date     APPENDECTOMY OPEN  01/01/1997     BIOPSY  2019?    Mole removed and biopsy performed        Family History   Problem Relation Age of Onset     Diabetes Father      Diabetes Paternal Grandfather      Breast Cancer Paternal Grandfather      Prostate Cancer Paternal Grandfather      Melanoma No family hx of        Social History     Socioeconomic History     Marital status:      Spouse name: Not on file     Number of children: Not on file     Years of education: Not on file     Highest education level: Not on file   Occupational History     Not on file   Tobacco Use     Smoking status: Never     Smokeless tobacco: Never   Vaping Use     Vaping status: Never Used   Substance and Sexual Activity     Alcohol use: Yes     Comment: social 1 time a week      Drug use: No     Sexual activity: Yes     Partners: Female   Other Topics Concern     Parent/sibling w/ CABG, MI or angioplasty before 65F 55M? No      Service No     Blood Transfusions No     Caffeine Concern No     Occupational Exposure No     Hobby Hazards No     Sleep Concern No     Stress Concern No     Weight Concern No     Special Diet No     Back Care No     Exercise Yes      Bike Helmet Yes     Seat Belt Yes     Self-Exams Yes   Social History Narrative     Not on file     Social Drivers of Health     Financial Resource Strain: Low Risk  (10/8/2024)    Financial Resource Strain      Within the past 12 months, have you or your family members you live with been unable to get utilities (heat, electricity) when it was really needed?: No   Food Insecurity: Low Risk  (10/8/2024)    Food Insecurity      Within the past 12 months, did you worry that your food would run out before you got money to buy more?: No      Within the past 12 months, did the food you bought just not last and you didn t have money to get more?: No   Transportation Needs: Low Risk  (10/8/2024)    Transportation Needs      Within the past 12 months, has lack of transportation kept you from medical appointments, getting your medicines, non-medical meetings or appointments, work, or from getting things that you need?: No   Physical Activity: Unknown (10/8/2024)    Exercise Vital Sign      Days of Exercise per Week: 2 days      Minutes of Exercise per Session: Not on file   Stress: No Stress Concern Present (10/8/2024)    [employer] [place] [employer] - Occupational Stress Questionnaire      Feeling of Stress : Not at all   Social Connections: Unknown (10/8/2024)    Social Connection and Isolation Panel [NHANES]      Frequency of Communication with Friends and Family: Not on file      Frequency of Social Gatherings with Friends and Family: Once a week      Attends [employer] Services: Not on file      Active Member of Clubs or Organizations: Not on file      Attends Club or Organization Meetings: Not on file      Marital Status: Not on file   Interpersonal Safety: Low Risk  (10/8/2024)    Interpersonal Safety      Do you feel physically and emotionally safe where you currently live?: Yes      Within the past 12 months, have you been hit, slapped, kicked or otherwise physically hurt by someone?: No      Within  the past 12 months, have you been humiliated or emotionally abused in other ways by your partner or ex-partner?: No   Housing Stability: Low Risk  (10/8/2024)    Housing Stability      Do you have housing? : Yes      Are you worried about losing your housing?: No       Outpatient Encounter Medications as of 12/3/2024   Medication Sig Dispense Refill     triamcinolone (KENALOG) 0.1 % external cream Apply topically 2 times daily Apply sparingly twice daily for next 3 weeks. 80 g 1     No facility-administered encounter medications on file as of 12/3/2024.             O:   NAD, WDWN, Alert & Oriented, Mood & Affect wnl, Vitals stable   Here today alone   There were no vitals taken for this visit.   General appearance normal   Vitals stable   Alert, oriented and in no acute distress       Well healed circular scar on right upper back   Stuck on papules and brown macules on trunk and ext   Red papules on trunk  Brown papules and macules with regular pigment network and borders on torso and extremities   The remainder of skin exam.          Eyes: Conjunctivae/lids:Normal     ENT: Lips: normal    MSK:Normal    Cardiovascular: peripheral edema none    Pulm: Breathing Normal    Neuro/Psych: Orientation:Alert and Orientedx3 ; Mood/Affect:normal     A/P:  1. History of severely atypical nevus   No evidence or recurrence.   2. atopic dermatitis on hips    Appears clear today    3. Seborrheic keratosis, lentigo, angioma, benign nevi   BENIGN LESIONS DISCUSSED WITH PATIENT:  I discussed the specifics of tumor, prognosis, and genetics of benign lesions.  I explained that treatment of these lesions would be purely cosmetic and not medically neccessary.  I discussed with patient different removal options including excision, cautery and /or laser.       Nature and genetics of benign skin lesions dicussed with patient.  Signs and Symptoms of skin cancer discussed with patient.  ABCDEs of melanoma reviewed with patient.  Patient  encouraged to perform monthly skin exams.  UV precautions reviewed with patient..    Risks of non-melanoma skin cancer discussed with patient   Return to clinic in one year or sooner if needed.       Again, thank you for allowing me to participate in the care of your patient.        Sincerely,        Denice Snowden PA-C